# Patient Record
Sex: FEMALE | Race: BLACK OR AFRICAN AMERICAN
[De-identification: names, ages, dates, MRNs, and addresses within clinical notes are randomized per-mention and may not be internally consistent; named-entity substitution may affect disease eponyms.]

---

## 2018-01-27 ENCOUNTER — TRANSCRIPTION ENCOUNTER (OUTPATIENT)
Age: 23
End: 2018-01-27

## 2018-02-03 ENCOUNTER — EMERGENCY (EMERGENCY)
Facility: HOSPITAL | Age: 23
LOS: 0 days | Discharge: HOME | End: 2018-02-03
Attending: EMERGENCY MEDICINE | Admitting: EMERGENCY MEDICINE
Payer: MEDICAID

## 2018-02-03 VITALS — HEIGHT: 56 IN | WEIGHT: 95.02 LBS

## 2018-02-03 VITALS
DIASTOLIC BLOOD PRESSURE: 67 MMHG | HEART RATE: 99 BPM | OXYGEN SATURATION: 100 % | SYSTOLIC BLOOD PRESSURE: 106 MMHG | TEMPERATURE: 99 F | RESPIRATION RATE: 18 BRPM

## 2018-02-03 DIAGNOSIS — R35.0 FREQUENCY OF MICTURITION: ICD-10-CM

## 2018-02-03 DIAGNOSIS — R39.15 URGENCY OF URINATION: ICD-10-CM

## 2018-02-03 LAB
APPEARANCE UR: CLEAR — SIGNIFICANT CHANGE UP
BACTERIA # UR AUTO: (no result)
BILIRUB UR-MCNC: NEGATIVE — SIGNIFICANT CHANGE UP
COLOR SPEC: YELLOW — SIGNIFICANT CHANGE UP
DIFF PNL FLD: (no result)
EPI CELLS # UR: SIGNIFICANT CHANGE UP
GLUCOSE UR QL: NEGATIVE MG/DL — SIGNIFICANT CHANGE UP
KETONES UR-MCNC: NEGATIVE — SIGNIFICANT CHANGE UP
LEUKOCYTE ESTERASE UR-ACNC: NEGATIVE — SIGNIFICANT CHANGE UP
NITRITE UR-MCNC: NEGATIVE — SIGNIFICANT CHANGE UP
PH UR: 7 — SIGNIFICANT CHANGE UP (ref 5–8)
PROT UR-MCNC: NEGATIVE MG/DL — SIGNIFICANT CHANGE UP
RBC CASTS # UR COMP ASSIST: >50 /HPF (ref 0–4)
SP GR SPEC: 1.01 — SIGNIFICANT CHANGE UP (ref 1.01–1.02)
UROBILINOGEN FLD QL: NEGATIVE MG/DL — SIGNIFICANT CHANGE UP
WBC UR QL: SIGNIFICANT CHANGE UP

## 2018-02-03 PROCEDURE — 99283 EMERGENCY DEPT VISIT LOW MDM: CPT

## 2018-02-03 RX ORDER — METHENAMINE MANDELATE 1 G
1 TABLET ORAL
Qty: 6 | Refills: 0 | OUTPATIENT
Start: 2018-02-03 | End: 2018-02-05

## 2018-02-03 NOTE — ED ADULT NURSE NOTE - OBJECTIVE STATEMENT
Pt reports constant feeling that her bladder is full yet cannot urinate enough to alleviate sensationof fullness.  Pt is tender suprapubic upon palpation.  Denies pain during urination, denies dysuria

## 2018-02-03 NOTE — ED PROVIDER NOTE - CONSTITUTIONAL, MLM
normal... Well appearing B F, well nourished, awake, alert, oriented to person, place, time/situation and in no apparent distress.

## 2018-02-03 NOTE — ED PROVIDER NOTE - PROGRESS NOTE DETAILS
Dr. Kelly:  Reevaluated patient at bedside.  Patient in good comfort, still w/ urinary frequency, no abd/back/flank pain, no N/V.  Discussed the results of all diagnostic testing in ED and copies of all reports given.   An opportunity to ask questions was given.  Pt agreeable w/ D/C on po Abx, outpt f/u early next week.  Discussed the importance of prompt, close medical follow-up.  Patient will return with any changes, concerns or persistent / worsening symptoms.  Understanding of all instructions verbalized. Dr. Kelly:  Bladder scan done w/ RN: no post-void residual noted in bladder, it was decompressed.  Reevaluated patient at bedside.  Patient in good comfort, still w/ urinary frequency, no abd/back/flank pain, no N/V.  Discussed the results of all diagnostic testing in ED and copies of all reports given.   An opportunity to ask questions was given.  Pt agreeable w/ D/C on po Abx, outpt f/u early next week.  Discussed the importance of prompt, close medical follow-up.  Patient will return with any changes, concerns or persistent / worsening symptoms.  Understanding of all instructions verbalized.

## 2018-02-03 NOTE — ED ADULT TRIAGE NOTE - CHIEF COMPLAINT QUOTE
Pt states she feels like her bladder is filling up but she is not urinating a lot x 2-3 days.  Pt states UA at PMD office was negative.  Pt denies fever or pain.

## 2018-02-03 NOTE — ED PROVIDER NOTE - OBJECTIVE STATEMENT
(Exam started 08:50) 21 y/o black F with no PMH ambulatory to ED for evaluation of urinary urgency for 2-3 days. Pt reports sensation of unable to empty bladder. No dysuria, hematuria, fever, chills, loss of appetite, n/v/d, flank pain, abd pain. LNMP 1/12. Pt evaluated 1 week ago for lower abd pain. At that time UA/UCG negative, no acute pathology. Pt on OCP. PCP is Reba. (Exam started 08:50) 23 y/o black F with no PMH ambulatory to ED for evaluation of urinary urgency for 2-3 days. Pt reports sensation of unable to empty bladder. No dysuria, hematuria, fever, chills, loss of appetite, n/v/d, flank pain, abd pain. LNMP 1/12. Pt evaluated 1 week ago at [facility] for lower abd pain, which had thereafter self-resolved. At that time UA/UCG negative, no acute pathology. Pt on OCP. PCP is Reba.

## 2018-02-03 NOTE — ED PROVIDER NOTE - GASTROINTESTINAL, MLM
Abdomen soft, slight ttp to suprapubic region due to feeling of full bladder being pressed on, no guarding.

## 2018-02-03 NOTE — ED PROVIDER NOTE - MEDICAL DECISION MAKING DETAILS
Young B F, no PMH p/w 2-3 days of urinary urgency, unable to empty bladder sensation. No other complaints, Fever, chills, n/v/d. Plan UA/UCG, bladder scan post void.

## 2018-02-04 LAB
CULTURE RESULTS: SIGNIFICANT CHANGE UP
SPECIMEN SOURCE: SIGNIFICANT CHANGE UP

## 2018-07-27 ENCOUNTER — TRANSCRIPTION ENCOUNTER (OUTPATIENT)
Age: 23
End: 2018-07-27

## 2018-12-30 ENCOUNTER — TRANSCRIPTION ENCOUNTER (OUTPATIENT)
Age: 23
End: 2018-12-30

## 2019-05-11 ENCOUNTER — EMERGENCY (EMERGENCY)
Facility: HOSPITAL | Age: 24
LOS: 0 days | Discharge: HOME | End: 2019-05-11
Attending: EMERGENCY MEDICINE | Admitting: EMERGENCY MEDICINE
Payer: COMMERCIAL

## 2019-05-11 VITALS
DIASTOLIC BLOOD PRESSURE: 70 MMHG | HEART RATE: 95 BPM | RESPIRATION RATE: 17 BRPM | SYSTOLIC BLOOD PRESSURE: 120 MMHG | OXYGEN SATURATION: 100 %

## 2019-05-11 VITALS — WEIGHT: 104.06 LBS | HEIGHT: 56 IN

## 2019-05-11 DIAGNOSIS — K29.70 GASTRITIS, UNSPECIFIED, WITHOUT BLEEDING: ICD-10-CM

## 2019-05-11 DIAGNOSIS — R11.10 VOMITING, UNSPECIFIED: ICD-10-CM

## 2019-05-11 DIAGNOSIS — R19.7 DIARRHEA, UNSPECIFIED: ICD-10-CM

## 2019-05-11 DIAGNOSIS — R11.2 NAUSEA WITH VOMITING, UNSPECIFIED: ICD-10-CM

## 2019-05-11 LAB
ALBUMIN SERPL ELPH-MCNC: 4.3 G/DL — SIGNIFICANT CHANGE UP (ref 3.3–5)
ALP SERPL-CCNC: 83 U/L — SIGNIFICANT CHANGE UP (ref 40–120)
ALT FLD-CCNC: 30 U/L — SIGNIFICANT CHANGE UP (ref 12–78)
ANION GAP SERPL CALC-SCNC: 8 MMOL/L — SIGNIFICANT CHANGE UP (ref 5–17)
APPEARANCE UR: CLEAR — SIGNIFICANT CHANGE UP
AST SERPL-CCNC: 27 U/L — SIGNIFICANT CHANGE UP (ref 15–37)
BACTERIA # UR AUTO: ABNORMAL
BASOPHILS # BLD AUTO: 0.01 K/UL — SIGNIFICANT CHANGE UP (ref 0–0.2)
BASOPHILS NFR BLD AUTO: 0.2 % — SIGNIFICANT CHANGE UP (ref 0–2)
BILIRUB SERPL-MCNC: 1.1 MG/DL — SIGNIFICANT CHANGE UP (ref 0.2–1.2)
BILIRUB UR-MCNC: NEGATIVE — SIGNIFICANT CHANGE UP
BUN SERPL-MCNC: 8 MG/DL — SIGNIFICANT CHANGE UP (ref 7–23)
CALCIUM SERPL-MCNC: 8.5 MG/DL — SIGNIFICANT CHANGE UP (ref 8.5–10.1)
CHLORIDE SERPL-SCNC: 101 MMOL/L — SIGNIFICANT CHANGE UP (ref 96–108)
CO2 SERPL-SCNC: 25 MMOL/L — SIGNIFICANT CHANGE UP (ref 22–31)
COLOR SPEC: YELLOW — SIGNIFICANT CHANGE UP
CREAT SERPL-MCNC: 0.93 MG/DL — SIGNIFICANT CHANGE UP (ref 0.5–1.3)
DIFF PNL FLD: ABNORMAL
EOSINOPHIL # BLD AUTO: 0.05 K/UL — SIGNIFICANT CHANGE UP (ref 0–0.5)
EOSINOPHIL NFR BLD AUTO: 1 % — SIGNIFICANT CHANGE UP (ref 0–6)
EPI CELLS # UR: ABNORMAL
GLUCOSE SERPL-MCNC: 100 MG/DL — HIGH (ref 70–99)
GLUCOSE UR QL: NEGATIVE MG/DL — SIGNIFICANT CHANGE UP
HCT VFR BLD CALC: 45.2 % — HIGH (ref 34.5–45)
HGB BLD-MCNC: 15.4 G/DL — SIGNIFICANT CHANGE UP (ref 11.5–15.5)
IMM GRANULOCYTES NFR BLD AUTO: 0.2 % — SIGNIFICANT CHANGE UP (ref 0–1.5)
KETONES UR-MCNC: NEGATIVE — SIGNIFICANT CHANGE UP
LEUKOCYTE ESTERASE UR-ACNC: NEGATIVE — SIGNIFICANT CHANGE UP
LYMPHOCYTES # BLD AUTO: 0.73 K/UL — LOW (ref 1–3.3)
LYMPHOCYTES # BLD AUTO: 14.6 % — SIGNIFICANT CHANGE UP (ref 13–44)
MAGNESIUM SERPL-MCNC: 1.7 MG/DL — SIGNIFICANT CHANGE UP (ref 1.6–2.6)
MCHC RBC-ENTMCNC: 30.3 PG — SIGNIFICANT CHANGE UP (ref 27–34)
MCHC RBC-ENTMCNC: 34.1 GM/DL — SIGNIFICANT CHANGE UP (ref 32–36)
MCV RBC AUTO: 89 FL — SIGNIFICANT CHANGE UP (ref 80–100)
MONOCYTES # BLD AUTO: 0.47 K/UL — SIGNIFICANT CHANGE UP (ref 0–0.9)
MONOCYTES NFR BLD AUTO: 9.4 % — SIGNIFICANT CHANGE UP (ref 2–14)
NEUTROPHILS # BLD AUTO: 3.73 K/UL — SIGNIFICANT CHANGE UP (ref 1.8–7.4)
NEUTROPHILS NFR BLD AUTO: 74.6 % — SIGNIFICANT CHANGE UP (ref 43–77)
NITRITE UR-MCNC: NEGATIVE — SIGNIFICANT CHANGE UP
NRBC # BLD: 0 /100 WBCS — SIGNIFICANT CHANGE UP (ref 0–0)
PH UR: 6 — SIGNIFICANT CHANGE UP (ref 5–8)
PLATELET # BLD AUTO: 327 K/UL — SIGNIFICANT CHANGE UP (ref 150–400)
POTASSIUM SERPL-MCNC: 3 MMOL/L — LOW (ref 3.5–5.3)
POTASSIUM SERPL-SCNC: 3 MMOL/L — LOW (ref 3.5–5.3)
PROT SERPL-MCNC: 8.4 GM/DL — HIGH (ref 6–8.3)
PROT UR-MCNC: 30 MG/DL
RBC # BLD: 5.08 M/UL — SIGNIFICANT CHANGE UP (ref 3.8–5.2)
RBC # FLD: 12.1 % — SIGNIFICANT CHANGE UP (ref 10.3–14.5)
RBC CASTS # UR COMP ASSIST: SIGNIFICANT CHANGE UP /HPF (ref 0–4)
SODIUM SERPL-SCNC: 134 MMOL/L — LOW (ref 135–145)
SP GR SPEC: 1.01 — SIGNIFICANT CHANGE UP (ref 1.01–1.02)
UROBILINOGEN FLD QL: NEGATIVE MG/DL — SIGNIFICANT CHANGE UP
WBC # BLD: 5 K/UL — SIGNIFICANT CHANGE UP (ref 3.8–10.5)
WBC # FLD AUTO: 5 K/UL — SIGNIFICANT CHANGE UP (ref 3.8–10.5)
WBC UR QL: SIGNIFICANT CHANGE UP

## 2019-05-11 PROCEDURE — 99284 EMERGENCY DEPT VISIT MOD MDM: CPT

## 2019-05-11 RX ORDER — FAMOTIDINE 10 MG/ML
20 INJECTION INTRAVENOUS ONCE
Refills: 0 | Status: COMPLETED | OUTPATIENT
Start: 2019-05-11 | End: 2019-05-11

## 2019-05-11 RX ORDER — SODIUM CHLORIDE 9 MG/ML
1000 INJECTION INTRAMUSCULAR; INTRAVENOUS; SUBCUTANEOUS ONCE
Refills: 0 | Status: COMPLETED | OUTPATIENT
Start: 2019-05-11 | End: 2019-05-11

## 2019-05-11 RX ORDER — POTASSIUM CHLORIDE 20 MEQ
1 PACKET (EA) ORAL
Qty: 2 | Refills: 0
Start: 2019-05-11 | End: 2019-05-11

## 2019-05-11 RX ORDER — POTASSIUM CHLORIDE 20 MEQ
40 PACKET (EA) ORAL ONCE
Refills: 0 | Status: COMPLETED | OUTPATIENT
Start: 2019-05-11 | End: 2019-05-11

## 2019-05-11 RX ORDER — LIDOCAINE 4 G/100G
5 CREAM TOPICAL ONCE
Refills: 0 | Status: COMPLETED | OUTPATIENT
Start: 2019-05-11 | End: 2019-05-11

## 2019-05-11 RX ADMIN — Medication 30 MILLILITER(S): at 12:51

## 2019-05-11 RX ADMIN — SODIUM CHLORIDE 2000 MILLILITER(S): 9 INJECTION INTRAMUSCULAR; INTRAVENOUS; SUBCUTANEOUS at 11:46

## 2019-05-11 RX ADMIN — LIDOCAINE 5 MILLILITER(S): 4 CREAM TOPICAL at 12:51

## 2019-05-11 RX ADMIN — Medication 40 MILLIEQUIVALENT(S): at 12:20

## 2019-05-11 RX ADMIN — FAMOTIDINE 20 MILLIGRAM(S): 10 INJECTION INTRAVENOUS at 11:45

## 2019-05-11 NOTE — ED ADULT NURSE NOTE - CHPI ED NUR SYMPTOMS NEG
no blood in stool/no burning urination/no dysuria/no hematuria/no abdominal distension/no chills/no fever

## 2019-05-11 NOTE — ED STATDOCS - PROGRESS NOTE DETAILS
24 yr. old female PMH: presents to ED with nausea ,vomiting and diarrhea onset friday after going out drinking and having Chinese food. Yesterday felt faint ,+ diaphoresis and hypoglycemic. Seen and examined by attending in Intake. 24 yr. old female PMH: presents to ED with nausea ,vomiting and diarrhea onset friday after going out drinking and having Chinese food. Yesterday felt faint ,+ diaphoresis and hypoglycemic. Seen and examined by attending in Intake. Plan: IV, IVF, Labs, Zofran. Will F/U with results and re evaluate. Jessica KAUFMAN Results of Lab work discussed with patient and instructed to take KCL as directed and  Pepcid 20 mg. PO every 12 hrs. for epigastric pain. Avoid alcohol and spicy foods. F/U with GI. Jessica KAUFMAN

## 2019-05-11 NOTE — ED ADULT TRIAGE NOTE - NSWEIGHTCALCTOOLDRUG_GEN_A_CORE
Pt dropped off [facility] paper work to filled out. Aware it may take 7 business days. Call [phone] when ready.  used

## 2019-05-11 NOTE — ED STATDOCS - CLINICAL SUMMARY MEDICAL DECISION MAKING FREE TEXT BOX
sx could be due to gastritis vs. gastroenteritis vs. PUD vs. food-borne illness. IV fluids, hydration, labs to check liver function, kidney function, electrolytes, reassess.

## 2019-05-11 NOTE — ED STATDOCS - OBJECTIVE STATEMENT
25 y/o female with no PMHx presents to the ED c/o N/V/D following night of EtOH intake. Pt notes that two nights ago, she was out drinking for cousin's birthday after which she ate chinese food. Next morning, pt had multiple episodes of N/V x2 now resolved, multiple episodes of watery diarrhea. Pt states that yesterday she became diaphoretic characteristic of a hypoglycemic attack which she has had before. Denies hematemesis, hematuria, urinary frequency. No recent travel. No sick contacts. Pt is a nursing student. LNMP 1 month ago. Dc'd birth control 5 months ago.

## 2019-05-11 NOTE — ED STATDOCS - ATTENDING CONTRIBUTION TO CARE
I, Daron Zepeda, performed the initial face to face bedside interview with this patient regarding history of present illness, review of symptoms and relevant past medical, social and family history.  I completed an independent physical examination.  I was the initial provider who evaluated this patient. I have signed out the follow up of any pending tests (i.e. labs, radiological studies) to the ACP.  I have communicated the patient’s plan of care and disposition with the ACP.  The history, relevant review of systems, past medical and surgical history, medical decision making, and physical examination was documented by the scribe in my presence and I attest to the accuracy of the documentation.

## 2019-05-11 NOTE — ED STATDOCS - CARE PLAN
Principal Discharge DX:	Diarrhea  Secondary Diagnosis:	Nausea & vomiting  Secondary Diagnosis:	Gastritis

## 2019-05-11 NOTE — ED ADULT NURSE NOTE - OBJECTIVE STATEMENT
pt came to ED for evaluation of N/V/D after two nights of alcohol drinking. Pt sts she also had chinese food before the symptoms started. Denies any pain or any other complaints.

## 2019-05-12 LAB
CULTURE RESULTS: SIGNIFICANT CHANGE UP
SPECIMEN SOURCE: SIGNIFICANT CHANGE UP

## 2019-06-12 ENCOUNTER — EMERGENCY (EMERGENCY)
Facility: HOSPITAL | Age: 24
LOS: 0 days | Discharge: HOME | End: 2019-06-13
Attending: EMERGENCY MEDICINE | Admitting: EMERGENCY MEDICINE
Payer: COMMERCIAL

## 2019-06-12 VITALS — WEIGHT: 104.06 LBS | HEIGHT: 56 IN

## 2019-06-12 DIAGNOSIS — R07.89 OTHER CHEST PAIN: ICD-10-CM

## 2019-06-12 PROCEDURE — 99284 EMERGENCY DEPT VISIT MOD MDM: CPT | Mod: 25

## 2019-06-13 VITALS
RESPIRATION RATE: 17 BRPM | SYSTOLIC BLOOD PRESSURE: 120 MMHG | OXYGEN SATURATION: 100 % | DIASTOLIC BLOOD PRESSURE: 60 MMHG | HEART RATE: 90 BPM

## 2019-06-13 PROCEDURE — 93010 ELECTROCARDIOGRAM REPORT: CPT

## 2019-06-13 RX ORDER — LIDOCAINE 4 G/100G
10 CREAM TOPICAL ONCE
Refills: 0 | Status: COMPLETED | OUTPATIENT
Start: 2019-06-13 | End: 2019-06-13

## 2019-06-13 RX ADMIN — Medication 30 MILLILITER(S): at 01:08

## 2019-06-13 RX ADMIN — LIDOCAINE 10 MILLILITER(S): 4 CREAM TOPICAL at 01:08

## 2019-06-13 NOTE — ED PROVIDER NOTE - CLINICAL SUMMARY MEDICAL DECISION MAKING FREE TEXT BOX
atypical chest pain, no cardiac risk factors.  EKG wnl.  Pt informed states she came to ED to "make sure I wasn't having a heart attack"  however, pt does not want blood work done.  Pt given maalox/lido and feels better. wants to go home. will f/u as oupt

## 2019-06-13 NOTE — ED PROVIDER NOTE - NSFOLLOWUPINSTRUCTIONS_ED_ALL_ED_FT
Follow up with your PMD and with your cardiologist today  Return to ED for any further concerns/worsening symptoms  Watch your diet to see if you see a correlation between what you're eating and the symptoms    Chest Pain    Chest pain can be caused by many different conditions which may or may not be dangerous. Causes include heartburn, lung infections, heart attack, blood clot in lungs, skin infections, strain or damage to muscle, cartilage, or bones, etc. In addition to a history and physical examination, an electrocardiogram (ECG) or other lab tests may have been performed to determine the cause of your chest pain. Follow up with your primary care provider or with a cardiologist as instructed.     SEEK IMMEDIATE MEDICAL CARE IF YOU HAVE ANY OF THE FOLLOWING SYMPTOMS: worsening chest pain, coughing up blood, unexplained back/neck/jaw pain, severe abdominal pain, dizziness or lightheadedness, fainting, shortness of breath, sweaty or clammy skin, vomiting, or racing heart beat. These symptoms may represent a serious problem that is an emergency. Do not wait to see if the symptoms will go away. Get medical help right away. Call 911 and do not drive yourself to the hospital.

## 2019-06-13 NOTE — ED PROVIDER NOTE - OBJECTIVE STATEMENT
23 yo female c/o intermittent chest pain for the past 3 days.  Pt states the pain comes and goes, is substernal and sometimes left upper chest, sometimes radiates to the back or the neck.  Has had similar pain on and off for almost a year.  About 1 year ago, [facility] sent her to a cardiologist because of a murmur found on physical exam.  She saw a cardiologist at [facility] (?name) and had an Echo 23 yo female c/o intermittent chest pain for the past 3 days.  Pt states the pain comes and goes, is substernal and sometimes left upper chest, sometimes radiates to the back or the neck.  Has had similar pain on and off for almost a year.  About 1 year ago, PMD sent her to a cardiologist because of a murmur found on physical exam.  She saw a cardiologist at [facility] (?name) and had an Echo which was "fine" as per pt.  Nonsmoker, occasional drinker, last drank Saturday, no drugs.  LMP now. 23 yo female c/o intermittent chest pain for the past 3 days.  Pt states the pain comes and goes, is substernal and sometimes left upper chest, sometimes radiates to the back or the neck.  Has had similar pain on and off for almost a year.  About 1 year ago, PMD sent her to a cardiologist because of a murmur found on physical exam.  She saw a cardiologist at [facility] (?name) and had an Echo which was "fine" as per pt.  Nonsmoker, occasional drinker, last drank Saturday, no drugs.  LMP now. Pt states she has been eating a lot of "junk food"

## 2019-06-13 NOTE — ED ADULT NURSE NOTE - NSIMPLEMENTINTERV_GEN_ALL_ED
Implemented All Universal Safety Interventions:  [place] to call system. Call bell, personal items and telephone within reach. Instruct patient to call for assistance. Room bathroom lighting operational. Non-slip footwear when patient is off stretcher. Physically safe environment: no spills, clutter or unnecessary equipment. Stretcher in lowest position, wheels locked, appropriate side rails in place.

## 2020-04-25 ENCOUNTER — MESSAGE (OUTPATIENT)
Age: 25
End: 2020-04-25

## 2020-05-05 ENCOUNTER — TRANSCRIPTION ENCOUNTER (OUTPATIENT)
Age: 25
End: 2020-05-05

## 2020-06-10 ENCOUNTER — LABORATORY RESULT (OUTPATIENT)
Age: 25
End: 2020-06-10

## 2020-06-10 ENCOUNTER — APPOINTMENT (OUTPATIENT)
Dept: FAMILY MEDICINE | Facility: CLINIC | Age: 25
End: 2020-06-10
Payer: COMMERCIAL

## 2020-06-10 VITALS
HEIGHT: 56 IN | WEIGHT: 112 LBS | BODY MASS INDEX: 25.19 KG/M2 | RESPIRATION RATE: 16 BRPM | OXYGEN SATURATION: 96 % | HEART RATE: 98 BPM | DIASTOLIC BLOOD PRESSURE: 62 MMHG | SYSTOLIC BLOOD PRESSURE: 102 MMHG

## 2020-06-10 DIAGNOSIS — Z11.59 ENCOUNTER FOR SCREENING FOR OTHER VIRAL DISEASES: ICD-10-CM

## 2020-06-10 PROCEDURE — 36415 COLL VENOUS BLD VENIPUNCTURE: CPT

## 2020-06-10 PROCEDURE — G0442 ANNUAL ALCOHOL SCREEN 15 MIN: CPT

## 2020-06-10 PROCEDURE — 99385 PREV VISIT NEW AGE 18-39: CPT | Mod: 25

## 2020-06-10 NOTE — HEALTH RISK ASSESSMENT
[Excellent] : ~his/her~  mood as  excellent [Yes] : Yes [2 - 4 times a month (2 pts)] : 2-4 times a month (2 points) [1 or 2 (0 pts)] : 1 or 2 (0 points) [Never (0 pts)] : Never (0 points) [No] : In the past 12 months have you used drugs other than those required for medical reasons? No [0] : 1) Little interest or pleasure doing things: Not at all (0) [Patient reported PAP Smear was normal] : Patient reported PAP Smear was normal [HIV Test offered] : HIV Test offered [None] : None [With Significant Other] : lives with significant other [Employed] : employed [Significant Other] : lives with significant other [Sexually Active] : sexually active [Feels Safe at Home] : Feels safe at home [Smoke Detector] : smoke detector [Carbon Monoxide Detector] : carbon monoxide detector [Seat Belt] :  uses seat belt [Sunscreen] : uses sunscreen [Patient/Caregiver unclear of wishes] : Patient/Caregiver unclear of wishes [] : No [[id number]] : 0 [Audit-CScore] : 2 [Change in mental status noted] : No change in mental status noted [Language] : denies difficulty with language [High Risk Behavior] : no high risk behavior [Reports changes in hearing] : Reports no changes in hearing [Reports changes in vision] : Reports no changes in vision [TB Exposure] : is not being exposed to tuberculosis [Reports changes in dental health] : Reports no changes in dental health [PapSmearDate] : 8/2019 [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

## 2020-06-10 NOTE — ASSESSMENT
[FreeTextEntry1] : Annual/ Establish care\par Comprehensive labs\par Pap UTD\par Works as RN in [employer] \par Requesting Antibody testing.

## 2020-06-10 NOTE — HEALTH RISK ASSESSMENT
[Excellent] : ~his/her~  mood as  excellent [Yes] : Yes [2 - 4 times a month (2 pts)] : 2-4 times a month (2 points) [1 or 2 (0 pts)] : 1 or 2 (0 points) [Never (0 pts)] : Never (0 points) [No] : In the past 12 months have you used drugs other than those required for medical reasons? No [0] : 1) Little interest or pleasure doing things: Not at all (0) [Patient reported PAP Smear was normal] : Patient reported PAP Smear was normal [HIV Test offered] : HIV Test offered [None] : None [With Significant Other] : lives with significant other [Employed] : employed [Sexually Active] : sexually active [Significant Other] : lives with significant other [Feels Safe at Home] : Feels safe at home [Smoke Detector] : smoke detector [Carbon Monoxide Detector] : carbon monoxide detector [Seat Belt] :  uses seat belt [Sunscreen] : uses sunscreen [Patient/Caregiver unclear of wishes] : Patient/Caregiver unclear of wishes [] : No [[id number]] : 0 [Audit-CScore] : 2 [Change in mental status noted] : No change in mental status noted [Language] : denies difficulty with language [High Risk Behavior] : no high risk behavior [Reports changes in hearing] : Reports no changes in hearing [Reports changes in vision] : Reports no changes in vision [Reports changes in dental health] : Reports no changes in dental health [TB Exposure] : is not being exposed to tuberculosis [PapSmearDate] : 8/2019 [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

## 2020-06-10 NOTE — ASSESSMENT
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

## 2020-06-10 NOTE — PHYSICAL EXAM
[No Acute Distress] : no acute distress [Well Developed] : well developed [Well Nourished] : well nourished [Well-Appearing] : well-appearing [Normal Sclera/Conjunctiva] : normal sclera/conjunctiva [PERRL] : pupils equal round and reactive to light [EOMI] : extraocular movements intact [Normal Outer Ear/Nose] : the outer ears and nose were normal in appearance [Normal Oropharynx] : the oropharynx was normal [No JVD] : no jugular venous distention [No Lymphadenopathy] : no lymphadenopathy [Supple] : supple [Thyroid Normal, No Nodules] : the thyroid was normal and there were no nodules present [No Accessory Muscle Use] : no accessory muscle use [No Respiratory Distress] : no respiratory distress  [Clear to Auscultation] : lungs were clear to auscultation bilaterally [Normal Rate] : normal rate  [Normal S1, S2] : normal S1 and S2 [Regular Rhythm] : with a regular rhythm [No Murmur] : no murmur heard [No Carotid Bruits] : no carotid bruits [No Abdominal Bruit] : a ~M bruit was not heard ~T in the abdomen [No Varicosities] : no varicosities [Pedal Pulses Present] : the pedal pulses are present [No Palpable Aorta] : no palpable aorta [No Edema] : there was no peripheral edema [No Extremity Clubbing/Cyanosis] : no extremity clubbing/cyanosis [Soft] : abdomen soft [Non-distended] : non-distended [Non Tender] : non-tender [No Masses] : no abdominal mass palpated [No HSM] : no HSM [Normal Posterior Cervical Nodes] : no posterior cervical lymphadenopathy [Normal Bowel Sounds] : normal bowel sounds [Normal Anterior Cervical Nodes] : no anterior cervical lymphadenopathy [No CVA Tenderness] : no CVA  tenderness [No Spinal Tenderness] : no spinal tenderness [No Joint Swelling] : no joint swelling [Grossly Normal Strength/Tone] : grossly normal strength/tone [No Rash] : no rash [Coordination Grossly Intact] : coordination grossly intact [No Focal Deficits] : no focal deficits [Normal Gait] : normal gait [Deep Tendon Reflexes (DTR)] : deep tendon reflexes were 2+ and symmetric [Normal Affect] : the affect was normal [Normal Insight/Judgement] : insight and judgment were intact

## 2020-06-10 NOTE — HISTORY OF PRESENT ILLNESS
[FreeTextEntry1] : annual/ establish care [de-identified] : 25 yr old female here to establish care

## 2020-06-10 NOTE — PHYSICAL EXAM
[No Acute Distress] : no acute distress [Well Nourished] : well nourished [Well Developed] : well developed [Well-Appearing] : well-appearing [Normal Sclera/Conjunctiva] : normal sclera/conjunctiva [PERRL] : pupils equal round and reactive to light [EOMI] : extraocular movements intact [Normal Outer Ear/Nose] : the outer ears and nose were normal in appearance [Normal Oropharynx] : the oropharynx was normal [No JVD] : no jugular venous distention [Supple] : supple [No Lymphadenopathy] : no lymphadenopathy [Thyroid Normal, No Nodules] : the thyroid was normal and there were no nodules present [No Respiratory Distress] : no respiratory distress  [No Accessory Muscle Use] : no accessory muscle use [Clear to Auscultation] : lungs were clear to auscultation bilaterally [Normal Rate] : normal rate  [Regular Rhythm] : with a regular rhythm [Normal S1, S2] : normal S1 and S2 [No Murmur] : no murmur heard [No Carotid Bruits] : no carotid bruits [No Varicosities] : no varicosities [Pedal Pulses Present] : the pedal pulses are present [No Abdominal Bruit] : a ~M bruit was not heard ~T in the abdomen [No Palpable Aorta] : no palpable aorta [No Edema] : there was no peripheral edema [No Extremity Clubbing/Cyanosis] : no extremity clubbing/cyanosis [Soft] : abdomen soft [Non-distended] : non-distended [Non Tender] : non-tender [No Masses] : no abdominal mass palpated [No HSM] : no HSM [Normal Posterior Cervical Nodes] : no posterior cervical lymphadenopathy [Normal Bowel Sounds] : normal bowel sounds [Normal Anterior Cervical Nodes] : no anterior cervical lymphadenopathy [No Spinal Tenderness] : no spinal tenderness [No CVA Tenderness] : no CVA  tenderness [Grossly Normal Strength/Tone] : grossly normal strength/tone [No Joint Swelling] : no joint swelling [No Rash] : no rash [Coordination Grossly Intact] : coordination grossly intact [No Focal Deficits] : no focal deficits [Deep Tendon Reflexes (DTR)] : deep tendon reflexes were 2+ and symmetric [Normal Gait] : normal gait [Normal Insight/Judgement] : insight and judgment were intact [Normal Affect] : the affect was normal

## 2020-06-10 NOTE — HISTORY OF PRESENT ILLNESS
[FreeTextEntry1] : annual/ establish care [de-identified] : 25 yr old female here to establish care

## 2020-06-17 ENCOUNTER — TRANSCRIPTION ENCOUNTER (OUTPATIENT)
Age: 25
End: 2020-06-17

## 2020-06-17 LAB
25(OH)D3 SERPL-MCNC: 15.5 NG/ML
ALBUMIN SERPL ELPH-MCNC: 4.6 G/DL
ALP BLD-CCNC: 62 U/L
ALT SERPL-CCNC: 14 U/L
ANION GAP SERPL CALC-SCNC: 13 MMOL/L
APPEARANCE: ABNORMAL
AST SERPL-CCNC: 19 U/L
BASOPHILS # BLD AUTO: 0.03 K/UL
BASOPHILS NFR BLD AUTO: 0.5 %
BILIRUB SERPL-MCNC: 0.5 MG/DL
BILIRUBIN URINE: NEGATIVE
BLOOD URINE: NEGATIVE
BUN SERPL-MCNC: 11 MG/DL
CALCIUM SERPL-MCNC: 9.3 MG/DL
CHLORIDE SERPL-SCNC: 102 MMOL/L
CHOLEST SERPL-MCNC: 176 MG/DL
CHOLEST/HDLC SERPL: 3.3 RATIO
CO2 SERPL-SCNC: 24 MMOL/L
COLOR: YELLOW
CREAT SERPL-MCNC: 0.76 MG/DL
EOSINOPHIL # BLD AUTO: 0.11 K/UL
EOSINOPHIL NFR BLD AUTO: 1.9 %
ESTIMATED AVERAGE GLUCOSE: 105 MG/DL
GLUCOSE QUALITATIVE U: NEGATIVE
GLUCOSE SERPL-MCNC: 98 MG/DL
HBA1C MFR BLD HPLC: 5.3 %
HCT VFR BLD CALC: 40.5 %
HDLC SERPL-MCNC: 53 MG/DL
HGB BLD-MCNC: 13 G/DL
IMM GRANULOCYTES NFR BLD AUTO: 0.2 %
KETONES URINE: NORMAL
LDLC SERPL CALC-MCNC: 107 MG/DL
LEUKOCYTE ESTERASE URINE: NEGATIVE
LYMPHOCYTES # BLD AUTO: 2.14 K/UL
LYMPHOCYTES NFR BLD AUTO: 37.2 %
MAN DIFF?: NORMAL
MCHC RBC-ENTMCNC: 29.8 PG
MCHC RBC-ENTMCNC: 32.1 GM/DL
MCV RBC AUTO: 92.9 FL
MONOCYTES # BLD AUTO: 0.65 K/UL
MONOCYTES NFR BLD AUTO: 11.3 %
NEUTROPHILS # BLD AUTO: 2.82 K/UL
NEUTROPHILS NFR BLD AUTO: 48.9 %
NITRITE URINE: NEGATIVE
PH URINE: 6
PLATELET # BLD AUTO: 326 K/UL
POTASSIUM SERPL-SCNC: 3.6 MMOL/L
PROT SERPL-MCNC: 6.9 G/DL
PROTEIN URINE: NORMAL
RBC # BLD: 4.36 M/UL
RBC # FLD: 12.9 %
SARS-COV-2 IGG SERPL IA-ACNC: <0.1 INDEX
SARS-COV-2 IGG SERPL QL IA: NEGATIVE
SODIUM SERPL-SCNC: 139 MMOL/L
SPECIFIC GRAVITY URINE: 1.03
T4 SERPL-MCNC: 7.3 UG/DL
TRIGL SERPL-MCNC: 85 MG/DL
TSH SERPL-ACNC: 3.34 UIU/ML
URATE SERPL-MCNC: 5.4 MG/DL
UROBILINOGEN URINE: NORMAL
WBC # FLD AUTO: 5.76 K/UL

## 2020-08-03 ENCOUNTER — APPOINTMENT (OUTPATIENT)
Dept: OBGYN | Facility: CLINIC | Age: 25
End: 2020-08-03
Payer: COMMERCIAL

## 2020-08-03 VITALS
BODY MASS INDEX: 25.42 KG/M2 | HEIGHT: 56 IN | DIASTOLIC BLOOD PRESSURE: 70 MMHG | SYSTOLIC BLOOD PRESSURE: 120 MMHG | WEIGHT: 113 LBS

## 2020-08-03 DIAGNOSIS — Z30.011 ENCOUNTER FOR INITIAL PRESCRIPTION OF CONTRACEPTIVE PILLS: ICD-10-CM

## 2020-08-03 DIAGNOSIS — Z01.419 ENCOUNTER FOR GYNECOLOGICAL EXAMINATION (GENERAL) (ROUTINE) W/OUT ABNORMAL FINDINGS: ICD-10-CM

## 2020-08-03 PROCEDURE — 99385 PREV VISIT NEW AGE 18-39: CPT

## 2020-08-04 LAB
C TRACH RRNA SPEC QL NAA+PROBE: NOT DETECTED
N GONORRHOEA RRNA SPEC QL NAA+PROBE: NOT DETECTED
SOURCE TP AMPLIFICATION: NORMAL

## 2020-09-03 ENCOUNTER — RX RENEWAL (OUTPATIENT)
Age: 25
End: 2020-09-03

## 2020-09-14 NOTE — ED ADULT NURSE NOTE - ATTEMPT TO OOB
Department of Anesthesiology  Postprocedure Note    Patient: Ailyn Ritter  MRN: [id number]  YOB: 1955  Date of evaluation: 9/14/2020  Time:  12:47 PM     Procedure Summary     Date:  09/14/20 Room / Location:  [address] [place] [address]    Anesthesia Start:  1673 Anesthesia Stop:  5526    Procedure:  Laparoscopic Robotic Assisted Left Inguinal Hernia Repair (Left ) Diagnosis:  (Bilateral Inguinal Hernias)    Surgeon:  Lukasz Pierce DO Responsible Provider:  MENDOZA Carr CRNA    Anesthesia Type:  general ASA Status:  2          Anesthesia Type: general    Joshua Phase I: Joshua Score: 10    Joshua Phase II:      Last vitals: Reviewed and per EMR flowsheets.        Anesthesia Post Evaluation    Patient location during evaluation: bedside  Patient participation: complete - patient participated  Level of consciousness: sleepy but conscious  Pain score: 1  Airway patency: patent  Nausea & Vomiting: no nausea and no vomiting  Complications: no  Cardiovascular status: hemodynamically stable  Respiratory status: acceptable  Hydration status: euvolemic no

## 2020-12-23 PROBLEM — Z01.419 ENCOUNTER FOR ANNUAL ROUTINE GYNECOLOGICAL EXAMINATION: Status: RESOLVED | Noted: 2020-08-03 | Resolved: 2020-12-23

## 2021-02-26 ENCOUNTER — NON-APPOINTMENT (OUTPATIENT)
Age: 26
End: 2021-02-26

## 2021-03-15 ENCOUNTER — APPOINTMENT (OUTPATIENT)
Dept: OBGYN | Facility: CLINIC | Age: 26
End: 2021-03-15
Payer: COMMERCIAL

## 2021-03-15 VITALS
DIASTOLIC BLOOD PRESSURE: 66 MMHG | HEIGHT: 56 IN | BODY MASS INDEX: 25.19 KG/M2 | SYSTOLIC BLOOD PRESSURE: 118 MMHG | WEIGHT: 112 LBS

## 2021-03-15 DIAGNOSIS — Z30.09 ENCOUNTER FOR OTHER GENERAL COUNSELING AND ADVICE ON CONTRACEPTION: ICD-10-CM

## 2021-03-15 DIAGNOSIS — N92.6 IRREGULAR MENSTRUATION, UNSPECIFIED: ICD-10-CM

## 2021-03-15 PROCEDURE — 99072 ADDL SUPL MATRL&STAF TM PHE: CPT

## 2021-03-15 PROCEDURE — 99212 OFFICE O/P EST SF 10 MIN: CPT

## 2021-03-16 PROBLEM — N92.6 IRREGULAR MENSES: Status: ACTIVE | Noted: 2021-03-16

## 2021-03-16 PROBLEM — Z30.09 ENCOUNTER FOR CONTRACEPTIVE PLANNING: Status: ACTIVE | Noted: 2021-03-16

## 2021-03-18 ENCOUNTER — APPOINTMENT (OUTPATIENT)
Dept: DERMATOLOGY | Facility: CLINIC | Age: 26
End: 2021-03-18
Payer: COMMERCIAL

## 2021-03-18 ENCOUNTER — NON-APPOINTMENT (OUTPATIENT)
Age: 26
End: 2021-03-18

## 2021-03-18 PROCEDURE — 99204 OFFICE O/P NEW MOD 45 MIN: CPT

## 2021-03-18 PROCEDURE — 99072 ADDL SUPL MATRL&STAF TM PHE: CPT

## 2021-06-17 ENCOUNTER — APPOINTMENT (OUTPATIENT)
Dept: DERMATOLOGY | Facility: CLINIC | Age: 26
End: 2021-06-17

## 2021-07-15 ENCOUNTER — APPOINTMENT (OUTPATIENT)
Dept: DERMATOLOGY | Facility: CLINIC | Age: 26
End: 2021-07-15
Payer: COMMERCIAL

## 2021-07-15 DIAGNOSIS — Z51.81 ENCOUNTER FOR THERAPEUTIC DRUG LVL MONITORING: ICD-10-CM

## 2021-07-15 PROCEDURE — 99072 ADDL SUPL MATRL&STAF TM PHE: CPT

## 2021-07-15 PROCEDURE — 99214 OFFICE O/P EST MOD 30 MIN: CPT

## 2021-07-16 PROBLEM — Z51.81 ENCOUNTER FOR MEDICATION MONITORING: Status: ACTIVE | Noted: 2021-03-18

## 2021-07-20 ENCOUNTER — TRANSCRIPTION ENCOUNTER (OUTPATIENT)
Age: 26
End: 2021-07-20

## 2021-08-24 ENCOUNTER — APPOINTMENT (OUTPATIENT)
Dept: INTERNAL MEDICINE | Facility: CLINIC | Age: 26
End: 2021-08-24
Payer: COMMERCIAL

## 2021-08-24 VITALS
HEART RATE: 99 BPM | DIASTOLIC BLOOD PRESSURE: 60 MMHG | SYSTOLIC BLOOD PRESSURE: 100 MMHG | TEMPERATURE: 98 F | RESPIRATION RATE: 16 BRPM | OXYGEN SATURATION: 98 % | HEIGHT: 56 IN | WEIGHT: 112 LBS | BODY MASS INDEX: 25.19 KG/M2

## 2021-08-24 PROCEDURE — 99395 PREV VISIT EST AGE 18-39: CPT

## 2021-08-24 RX ORDER — NORETHINDRONE ACETATE/ETHINYL ESTRADIOL AND FERROUS FUMARATE 1MG-20(21)
1-20 KIT ORAL DAILY
Qty: 1 | Refills: 5 | Status: DISCONTINUED | COMMUNITY
Start: 2020-08-03 | End: 2021-08-24

## 2021-08-24 NOTE — HISTORY OF PRESENT ILLNESS
[FreeTextEntry1] : wellness exam [de-identified] : Ms. MARVIN MARTINEZ  is    26 year female . pt.have no known medical history.\par sees dermatology for hyperhidrosis , on glycopyrrolate.\par Pt. is over all feeling well.\par No change in weight.\par No change is bowel and bladder habit.\par No trouble sleeping at night.\par Not on pain.\par

## 2021-08-24 NOTE — HEALTH RISK ASSESSMENT
[Excellent] : ~his/her~  mood as  excellent [Yes] : Yes [2 - 4 times a month (2 pts)] : 2-4 times a month (2 points) [1 or 2 (0 pts)] : 1 or 2 (0 points) [No] : In the past 12 months have you used drugs other than those required for medical reasons? No [No falls in past year] : Patient reported no falls in the past year [0] : 2) Feeling down, depressed, or hopeless: Not at all (0) [Patient reported PAP Smear was normal] : Patient reported PAP Smear was normal [HIV Test offered] : HIV Test offered [Hepatitis C test offered] : Hepatitis C test offered [None] : None [With Family] : lives with family [Employed] : employed [Single] : single [Sexually Active] : sexually active [Feels Safe at Home] : Feels safe at home [Fully functional (bathing, dressing, toileting, transferring, walking, feeding)] : Fully functional (bathing, dressing, toileting, transferring, walking, feeding) [Fully functional (using the telephone, shopping, preparing meals, housekeeping, doing laundry, using] : Fully functional and needs no help or supervision to perform IADLs (using the telephone, shopping, preparing meals, housekeeping, doing laundry, using transportation, managing medications and managing finances) [Carbon Monoxide Detector] : carbon monoxide detector [Seat Belt] :  uses seat belt [Sunscreen] : uses sunscreen [PHQ-2 Negative - No further assessment needed] : PHQ-2 Negative - No further assessment needed [] : No [de-identified] : do not exercise [de-identified] : trying to eat healthy [[id number]] : 0 [Change in mental status noted] : No change in mental status noted [Language] : denies difficulty with language [Behavior] : denies difficulty with behavior [High Risk Behavior] : no high risk behavior [Reports changes in hearing] : Reports no changes in hearing [Reports changes in vision] : Reports no changes in vision [Reports changes in dental health] : Reports no changes in dental health [Travel to Developing Areas] : does not  travel to developing areas [PapSmearDate] : 08/20

## 2021-08-24 NOTE — ASSESSMENT
[FreeTextEntry1] : HM:\par will do comprehensive blood work , screen for hyperlipidemia , diabetes and thyroid disorder.\par quantiferon gold tb test done\par labs drawn in the office.\par will review the results and address if abnormal.\par she is up to date on  PAP : result  normal.\par up to date with flu , tdap, covid vaccine.\par Ordered HIV and Hep C  screening test.\par alcohol screening :SIBRT:0\par Depression screening:PHQ2:0\par \par Recommended:\par Being physically active\par Maintaining a healthy weight\par Eating a diet rich in fruits, vegetables, whole grains, and low in saturated/trans fat\par safe sexual practice.\par Avoiding excess sun exposure.using sunscreen.\par .\par

## 2021-08-25 LAB
25(OH)D3 SERPL-MCNC: 21.5 NG/ML
ALBUMIN SERPL ELPH-MCNC: 4.4 G/DL
ALP BLD-CCNC: 61 U/L
ALT SERPL-CCNC: 11 U/L
ANION GAP SERPL CALC-SCNC: 15 MMOL/L
APPEARANCE: CLEAR
AST SERPL-CCNC: 18 U/L
BACTERIA: NEGATIVE
BASOPHILS # BLD AUTO: 0.03 K/UL
BASOPHILS NFR BLD AUTO: 0.6 %
BILIRUB SERPL-MCNC: 0.6 MG/DL
BILIRUBIN URINE: NEGATIVE
BLOOD URINE: NEGATIVE
BUN SERPL-MCNC: 11 MG/DL
CALCIUM SERPL-MCNC: 9.4 MG/DL
CHLORIDE SERPL-SCNC: 102 MMOL/L
CHOLEST SERPL-MCNC: 195 MG/DL
CO2 SERPL-SCNC: 21 MMOL/L
COLOR: YELLOW
CREAT SERPL-MCNC: 0.73 MG/DL
EOSINOPHIL # BLD AUTO: 0.09 K/UL
EOSINOPHIL NFR BLD AUTO: 1.8 %
GLUCOSE QUALITATIVE U: NEGATIVE
GLUCOSE SERPL-MCNC: 127 MG/DL
HCT VFR BLD CALC: 42.2 %
HCV AB SER QL: NONREACTIVE
HCV S/CO RATIO: 0.1 S/CO
HDLC SERPL-MCNC: 56 MG/DL
HGB BLD-MCNC: 13.3 G/DL
HIV1+2 AB SPEC QL IA.RAPID: NONREACTIVE
HYALINE CASTS: 1 /LPF
IMM GRANULOCYTES NFR BLD AUTO: 0.2 %
KETONES URINE: NEGATIVE
LDLC SERPL CALC-MCNC: 124 MG/DL
LEUKOCYTE ESTERASE URINE: NEGATIVE
LYMPHOCYTES # BLD AUTO: 1.95 K/UL
LYMPHOCYTES NFR BLD AUTO: 38.4 %
MAN DIFF?: NORMAL
MCHC RBC-ENTMCNC: 29.9 PG
MCHC RBC-ENTMCNC: 31.5 GM/DL
MCV RBC AUTO: 94.8 FL
MICROSCOPIC-UA: NORMAL
MONOCYTES # BLD AUTO: 0.52 K/UL
MONOCYTES NFR BLD AUTO: 10.2 %
NEUTROPHILS # BLD AUTO: 2.48 K/UL
NEUTROPHILS NFR BLD AUTO: 48.8 %
NITRITE URINE: NEGATIVE
NONHDLC SERPL-MCNC: 139 MG/DL
PH URINE: 5.5
PLATELET # BLD AUTO: 348 K/UL
POTASSIUM SERPL-SCNC: 3.7 MMOL/L
PROT SERPL-MCNC: 6.8 G/DL
PROTEIN URINE: NEGATIVE
RBC # BLD: 4.45 M/UL
RBC # FLD: 12.8 %
RED BLOOD CELLS URINE: 1 /HPF
SODIUM SERPL-SCNC: 138 MMOL/L
SPECIFIC GRAVITY URINE: 1.03
SQUAMOUS EPITHELIAL CELLS: 3 /HPF
TRIGL SERPL-MCNC: 73 MG/DL
UROBILINOGEN URINE: NORMAL
WBC # FLD AUTO: 5.08 K/UL
WHITE BLOOD CELLS URINE: 0 /HPF

## 2021-08-26 ENCOUNTER — TRANSCRIPTION ENCOUNTER (OUTPATIENT)
Age: 26
End: 2021-08-26

## 2021-08-31 ENCOUNTER — TRANSCRIPTION ENCOUNTER (OUTPATIENT)
Age: 26
End: 2021-08-31

## 2021-08-31 LAB
M TB IFN-G BLD-IMP: NEGATIVE
QUANTIFERON TB PLUS MITOGEN MINUS NIL: 7.82 IU/ML
QUANTIFERON TB PLUS NIL: 0.08 IU/ML
QUANTIFERON TB PLUS TB1 MINUS NIL: -0.04 IU/ML
QUANTIFERON TB PLUS TB2 MINUS NIL: -0.04 IU/ML

## 2021-09-07 ENCOUNTER — TRANSCRIPTION ENCOUNTER (OUTPATIENT)
Age: 26
End: 2021-09-07

## 2021-09-07 ENCOUNTER — NON-APPOINTMENT (OUTPATIENT)
Age: 26
End: 2021-09-07

## 2021-09-07 DIAGNOSIS — Z01.84 ENCOUNTER FOR ANTIBODY RESPONSE EXAMINATION: ICD-10-CM

## 2021-09-13 ENCOUNTER — TRANSCRIPTION ENCOUNTER (OUTPATIENT)
Age: 26
End: 2021-09-13

## 2021-09-13 LAB
HBV SURFACE AB SER QL: REACTIVE
MEV IGG FLD QL IA: 16.5 AU/ML
MEV IGG+IGM SER-IMP: POSITIVE
MUV AB SER-ACNC: POSITIVE
MUV IGG SER QL IA: 119 AU/ML
RUBV IGG FLD-ACNC: 4.2 INDEX
RUBV IGG SER-IMP: POSITIVE
VZV AB TITR SER: POSITIVE
VZV IGG SER IF-ACNC: 223.1 INDEX

## 2021-09-14 ENCOUNTER — TRANSCRIPTION ENCOUNTER (OUTPATIENT)
Age: 26
End: 2021-09-14

## 2021-09-15 ENCOUNTER — TRANSCRIPTION ENCOUNTER (OUTPATIENT)
Age: 26
End: 2021-09-15

## 2021-09-16 ENCOUNTER — TRANSCRIPTION ENCOUNTER (OUTPATIENT)
Age: 26
End: 2021-09-16

## 2021-09-20 ENCOUNTER — TRANSCRIPTION ENCOUNTER (OUTPATIENT)
Age: 26
End: 2021-09-20

## 2021-09-24 DIAGNOSIS — Z23 ENCOUNTER FOR IMMUNIZATION: ICD-10-CM

## 2021-09-28 ENCOUNTER — TRANSCRIPTION ENCOUNTER (OUTPATIENT)
Age: 26
End: 2021-09-28

## 2021-09-29 ENCOUNTER — APPOINTMENT (OUTPATIENT)
Dept: FAMILY MEDICINE | Facility: CLINIC | Age: 26
End: 2021-09-29
Payer: COMMERCIAL

## 2021-09-29 PROCEDURE — 90686 IIV4 VACC NO PRSV 0.5 ML IM: CPT

## 2021-09-29 PROCEDURE — 90471 IMMUNIZATION ADMIN: CPT

## 2021-09-30 ENCOUNTER — APPOINTMENT (OUTPATIENT)
Dept: OBGYN | Facility: CLINIC | Age: 26
End: 2021-09-30
Payer: COMMERCIAL

## 2021-09-30 VITALS
HEIGHT: 56 IN | WEIGHT: 114 LBS | BODY MASS INDEX: 25.64 KG/M2 | DIASTOLIC BLOOD PRESSURE: 60 MMHG | SYSTOLIC BLOOD PRESSURE: 100 MMHG

## 2021-09-30 DIAGNOSIS — Z01.419 ENCOUNTER FOR GYNECOLOGICAL EXAMINATION (GENERAL) (ROUTINE) W/OUT ABNORMAL FINDINGS: ICD-10-CM

## 2021-09-30 PROCEDURE — 99395 PREV VISIT EST AGE 18-39: CPT

## 2021-10-05 LAB
C TRACH RRNA SPEC QL NAA+PROBE: NOT DETECTED
N GONORRHOEA RRNA SPEC QL NAA+PROBE: NOT DETECTED
SOURCE AMPLIFICATION: NORMAL

## 2021-10-28 ENCOUNTER — APPOINTMENT (OUTPATIENT)
Dept: DERMATOLOGY | Facility: CLINIC | Age: 26
End: 2021-10-28
Payer: COMMERCIAL

## 2021-10-28 ENCOUNTER — APPOINTMENT (OUTPATIENT)
Dept: FAMILY MEDICINE | Facility: CLINIC | Age: 26
End: 2021-10-28

## 2021-10-28 PROCEDURE — 99213 OFFICE O/P EST LOW 20 MIN: CPT

## 2021-10-28 RX ORDER — ALUMINUM CHLORIDE 20 %
20 SOLUTION, NON-ORAL TOPICAL
Qty: 1 | Refills: 5 | Status: DISCONTINUED | COMMUNITY
Start: 2021-03-18 | End: 2021-10-28

## 2021-10-28 RX ORDER — GLYCOPYRROLATE 1 MG/1
1 TABLET ORAL
Qty: 270 | Refills: 1 | Status: DISCONTINUED | COMMUNITY
Start: 2021-03-18 | End: 2021-10-28

## 2021-11-30 ENCOUNTER — TRANSCRIPTION ENCOUNTER (OUTPATIENT)
Age: 26
End: 2021-11-30

## 2022-01-18 ENCOUNTER — APPOINTMENT (OUTPATIENT)
Dept: DERMATOLOGY | Facility: CLINIC | Age: 27
End: 2022-01-18
Payer: COMMERCIAL

## 2022-01-18 PROCEDURE — 99213 OFFICE O/P EST LOW 20 MIN: CPT

## 2022-04-05 ENCOUNTER — APPOINTMENT (OUTPATIENT)
Dept: DERMATOLOGY | Facility: CLINIC | Age: 27
End: 2022-04-05
Payer: COMMERCIAL

## 2022-04-05 PROCEDURE — 99214 OFFICE O/P EST MOD 30 MIN: CPT

## 2022-04-11 PROBLEM — Z11.59 SCREENING FOR VIRAL DISEASE: Status: ACTIVE | Noted: 2020-06-10

## 2022-05-02 ENCOUNTER — LABORATORY RESULT (OUTPATIENT)
Age: 27
End: 2022-05-02

## 2022-05-02 ENCOUNTER — APPOINTMENT (OUTPATIENT)
Dept: FAMILY MEDICINE | Facility: CLINIC | Age: 27
End: 2022-05-02
Payer: COMMERCIAL

## 2022-05-02 VITALS
OXYGEN SATURATION: 98 % | SYSTOLIC BLOOD PRESSURE: 110 MMHG | HEART RATE: 75 BPM | HEIGHT: 56 IN | TEMPERATURE: 97.6 F | RESPIRATION RATE: 15 BRPM | WEIGHT: 115 LBS | BODY MASS INDEX: 25.87 KG/M2 | DIASTOLIC BLOOD PRESSURE: 62 MMHG

## 2022-05-02 DIAGNOSIS — Z13.29 ENCOUNTER FOR SCREENING FOR OTHER SUSPECTED ENDOCRINE DISORDER: ICD-10-CM

## 2022-05-02 DIAGNOSIS — Z13.220 ENCOUNTER FOR SCREENING FOR LIPOID DISORDERS: ICD-10-CM

## 2022-05-02 DIAGNOSIS — Z13.39 ENCOUNTER FOR SCREENING EXAM FOR OTHER MENTAL HEALTH AND BEHAVIORAL DISORDERS: ICD-10-CM

## 2022-05-02 DIAGNOSIS — Z83.3 FAMILY HISTORY OF DIABETES MELLITUS: ICD-10-CM

## 2022-05-02 DIAGNOSIS — Z13.31 ENCOUNTER FOR SCREENING FOR DEPRESSION: ICD-10-CM

## 2022-05-02 DIAGNOSIS — Z11.3 ENCOUNTER FOR SCREENING FOR INFECTIONS WITH A PREDOMINANTLY SEXUAL MODE OF TRANSMISSION: ICD-10-CM

## 2022-05-02 DIAGNOSIS — Z00.00 ENCOUNTER FOR GENERAL ADULT MEDICAL EXAMINATION W/OUT ABNORMAL FINDINGS: ICD-10-CM

## 2022-05-02 DIAGNOSIS — R61 GENERALIZED HYPERHIDROSIS: ICD-10-CM

## 2022-05-02 DIAGNOSIS — Z11.4 ENCOUNTER FOR SCREENING FOR HUMAN IMMUNODEFICIENCY VIRUS [HIV]: ICD-10-CM

## 2022-05-02 PROCEDURE — G0444 DEPRESSION SCREEN ANNUAL: CPT | Mod: 59

## 2022-05-02 PROCEDURE — 36415 COLL VENOUS BLD VENIPUNCTURE: CPT

## 2022-05-02 PROCEDURE — 99395 PREV VISIT EST AGE 18-39: CPT | Mod: 25

## 2022-05-02 PROCEDURE — G0442 ANNUAL ALCOHOL SCREEN 15 MIN: CPT

## 2022-05-02 NOTE — PHYSICAL EXAM
[Well Nourished] : well nourished [Well Developed] : well developed [Well-Appearing] : well-appearing [Normal Sclera/Conjunctiva] : normal sclera/conjunctiva [Normal Outer Ear/Nose] : the outer ears and nose were normal in appearance [No JVD] : no jugular venous distention [No Lymphadenopathy] : no lymphadenopathy [Supple] : supple [No Respiratory Distress] : no respiratory distress  [No Accessory Muscle Use] : no accessory muscle use [Clear to Auscultation] : lungs were clear to auscultation bilaterally [Normal Rate] : normal rate  [Regular Rhythm] : with a regular rhythm [Normal S1, S2] : normal S1 and S2 [No Murmur] : no murmur heard [No Carotid Bruits] : no carotid bruits [No Abdominal Bruit] : a ~M bruit was not heard ~T in the abdomen [No Varicosities] : no varicosities [Pedal Pulses Present] : the pedal pulses are present [No Edema] : there was no peripheral edema [No Palpable Aorta] : no palpable aorta [No Extremity Clubbing/Cyanosis] : no extremity clubbing/cyanosis [Soft] : abdomen soft [Non Tender] : non-tender [Non-distended] : non-distended [No Masses] : no abdominal mass palpated [No HSM] : no HSM [Normal Bowel Sounds] : normal bowel sounds [Normal Posterior Cervical Nodes] : no posterior cervical lymphadenopathy [Normal Anterior Cervical Nodes] : no anterior cervical lymphadenopathy [No CVA Tenderness] : no CVA  tenderness [No Spinal Tenderness] : no spinal tenderness [No Joint Swelling] : no joint swelling [Grossly Normal Strength/Tone] : grossly normal strength/tone [No Rash] : no rash [Coordination Grossly Intact] : coordination grossly intact [No Focal Deficits] : no focal deficits [Normal Gait] : normal gait [Deep Tendon Reflexes (DTR)] : deep tendon reflexes were 2+ and symmetric [Normal Affect] : the affect was normal [Normal Insight/Judgement] : insight and judgment were intact

## 2022-05-02 NOTE — HISTORY OF PRESENT ILLNESS
[FreeTextEntry1] : Annual [de-identified] : Ms. MRAVIN MARTINEZ is a 27 year old female presenting for an annual.\par Seen by GYn Dr Batres 3/21.\par Not on OCP`s now. Getting  this year.\par Using condoms.\par Seen by Dermatologist Dr Garcia for hyperhidrosis. Had Botox helped a little going this week for 2nd round.

## 2022-05-02 NOTE — ASSESSMENT
[FreeTextEntry1] : Annual\par SBIRT negative\par Depression screen negative\par Check comprehensive labs, in office today\par Vaccines \par Adacel 2017\par Flu 9/2021\par COVID booster declined\par \par GYN\par  pap up to date 8/20\par Works as RN in [employer].\par Seen by GYn Dr Batres 3/21.\par Not on OCP`s now. Getting  this year.\par Using condoms.\par Seen by Dermatologist Dr Garcia for hyperhidrosis. Had Botox helped a little going this week for 2nd round.\par Follow up annual.

## 2022-05-02 NOTE — HEALTH RISK ASSESSMENT
[Good] : ~his/her~  mood as  good [Never] : Never [Yes] : Yes [2 - 4 times a month (2 pts)] : 2-4 times a month (2 points) [1 or 2 (0 pts)] : 1 or 2 (0 points) [Never (0 pts)] : Never (0 points) [No] : In the past 12 months have you used drugs other than those required for medical reasons? No [0] : 2) Feeling down, depressed, or hopeless: Not at all (0) [PHQ-2 Negative - No further assessment needed] : PHQ-2 Negative - No further assessment needed [Audit-CScore] : 2 [[id number]] : 0 [Patient reported PAP Smear was normal] : Patient reported PAP Smear was normal [HIV Test offered] : HIV Test offered [Hepatitis C test offered] : Hepatitis C test offered [Behavioral] : behavioral [With Significant Other] : lives with significant other [Employed] : employed [] :  [Sexually Active] : sexually active [High Risk Behavior] : no high risk behavior [Feels Safe at Home] : Feels safe at home [Reports changes in hearing] : Reports no changes in hearing [Reports changes in vision] : Reports no changes in vision [Reports changes in dental health] : Reports no changes in dental health [Smoke Detector] : smoke detector [Carbon Monoxide Detector] : carbon monoxide detector [Seat Belt] :  uses seat belt [Sunscreen] : uses sunscreen [TB Exposure] : is not being exposed to tuberculosis [PapSmearDate] : 8/2020 [Reviewed no changes] : Reviewed, no changes [Name: ___] : Health Care Proxy's Name: [unfilled]  [Relationship: ___] : Relationship: [unfilled] [AdvancecareDate] : 5/2022

## 2022-05-05 ENCOUNTER — TRANSCRIPTION ENCOUNTER (OUTPATIENT)
Age: 27
End: 2022-05-05

## 2022-05-05 DIAGNOSIS — R94.6 ABNORMAL RESULTS OF THYROID FUNCTION STUDIES: ICD-10-CM

## 2022-05-05 LAB
25(OH)D3 SERPL-MCNC: 16.1 NG/ML
ALBUMIN SERPL ELPH-MCNC: 4.6 G/DL
ALP BLD-CCNC: 62 U/L
ALT SERPL-CCNC: 12 U/L
ANION GAP SERPL CALC-SCNC: 11 MMOL/L
APPEARANCE: CLEAR
AST SERPL-CCNC: 17 U/L
BASOPHILS # BLD AUTO: 0.02 K/UL
BASOPHILS NFR BLD AUTO: 0.4 %
BILIRUB SERPL-MCNC: 0.9 MG/DL
BILIRUBIN URINE: NEGATIVE
BLOOD URINE: NEGATIVE
BUN SERPL-MCNC: 8 MG/DL
C TRACH RRNA SPEC QL NAA+PROBE: NOT DETECTED
CALCIUM SERPL-MCNC: 9.4 MG/DL
CHLORIDE SERPL-SCNC: 103 MMOL/L
CHOLEST SERPL-MCNC: 185 MG/DL
CO2 SERPL-SCNC: 26 MMOL/L
COLOR: YELLOW
CREAT SERPL-MCNC: 0.59 MG/DL
EGFR: 126 ML/MIN/1.73M2
EOSINOPHIL # BLD AUTO: 0.04 K/UL
EOSINOPHIL NFR BLD AUTO: 0.9 %
GLUCOSE QUALITATIVE U: NEGATIVE
GLUCOSE SERPL-MCNC: 97 MG/DL
HBV SURFACE AG SER QL: NONREACTIVE
HCT VFR BLD CALC: 42.5 %
HCV AB SER QL: NONREACTIVE
HCV S/CO RATIO: 0.12 S/CO
HDLC SERPL-MCNC: 58 MG/DL
HGB BLD-MCNC: 13.5 G/DL
HIV1+2 AB SPEC QL IA.RAPID: NONREACTIVE
IMM GRANULOCYTES NFR BLD AUTO: 0 %
KETONES URINE: NEGATIVE
LDLC SERPL CALC-MCNC: 113 MG/DL
LEUKOCYTE ESTERASE URINE: NEGATIVE
LYMPHOCYTES # BLD AUTO: 1.74 K/UL
LYMPHOCYTES NFR BLD AUTO: 38.1 %
MAN DIFF?: NORMAL
MCHC RBC-ENTMCNC: 30 PG
MCHC RBC-ENTMCNC: 31.8 GM/DL
MCV RBC AUTO: 94.4 FL
MONOCYTES # BLD AUTO: 0.48 K/UL
MONOCYTES NFR BLD AUTO: 10.5 %
N GONORRHOEA RRNA SPEC QL NAA+PROBE: NOT DETECTED
NEUTROPHILS # BLD AUTO: 2.29 K/UL
NEUTROPHILS NFR BLD AUTO: 50.1 %
NITRITE URINE: NEGATIVE
NONHDLC SERPL-MCNC: 128 MG/DL
PH URINE: 8
PLATELET # BLD AUTO: 406 K/UL
POTASSIUM SERPL-SCNC: 3.8 MMOL/L
PROT SERPL-MCNC: 7 G/DL
PROTEIN URINE: NEGATIVE
RBC # BLD: 4.5 M/UL
RBC # FLD: 12.9 %
SODIUM SERPL-SCNC: 140 MMOL/L
SOURCE AMPLIFICATION: NORMAL
SPECIFIC GRAVITY URINE: 1.02
TRIGL SERPL-MCNC: 73 MG/DL
TSH SERPL-ACNC: 0.05 UIU/ML
URATE SERPL-MCNC: 4.1 MG/DL
UROBILINOGEN URINE: ABNORMAL
WBC # FLD AUTO: 4.57 K/UL

## 2022-05-19 ENCOUNTER — NON-APPOINTMENT (OUTPATIENT)
Age: 27
End: 2022-05-19

## 2022-06-30 ENCOUNTER — TRANSCRIPTION ENCOUNTER (OUTPATIENT)
Age: 27
End: 2022-06-30

## 2022-06-30 ENCOUNTER — APPOINTMENT (OUTPATIENT)
Dept: DERMATOLOGY | Facility: CLINIC | Age: 27
End: 2022-06-30

## 2022-06-30 DIAGNOSIS — L74.510 PRIMARY FOCAL HYPERHIDROSIS, AXILLA: ICD-10-CM

## 2022-06-30 PROCEDURE — 64650 CHEMODENERV ECCRINE GLANDS: CPT

## 2022-10-07 ENCOUNTER — APPOINTMENT (OUTPATIENT)
Dept: OBGYN | Facility: CLINIC | Age: 27
End: 2022-10-07

## 2022-10-07 VITALS
WEIGHT: 112 LBS | BODY MASS INDEX: 25.19 KG/M2 | SYSTOLIC BLOOD PRESSURE: 110 MMHG | DIASTOLIC BLOOD PRESSURE: 60 MMHG | HEIGHT: 56 IN

## 2022-10-07 PROCEDURE — 99395 PREV VISIT EST AGE 18-39: CPT

## 2022-10-09 ENCOUNTER — NON-APPOINTMENT (OUTPATIENT)
Age: 27
End: 2022-10-09

## 2022-10-10 ENCOUNTER — APPOINTMENT (OUTPATIENT)
Dept: GASTROENTEROLOGY | Facility: CLINIC | Age: 27
End: 2022-10-10

## 2022-10-10 VITALS
SYSTOLIC BLOOD PRESSURE: 133 MMHG | HEIGHT: 56 IN | HEART RATE: 97 BPM | DIASTOLIC BLOOD PRESSURE: 72 MMHG | OXYGEN SATURATION: 100 % | BODY MASS INDEX: 25.64 KG/M2 | WEIGHT: 114 LBS

## 2022-10-10 DIAGNOSIS — D50.9 IRON DEFICIENCY ANEMIA, UNSPECIFIED: ICD-10-CM

## 2022-10-10 DIAGNOSIS — R79.89 OTHER SPECIFIED ABNORMAL FINDINGS OF BLOOD CHEMISTRY: ICD-10-CM

## 2022-10-10 DIAGNOSIS — K21.9 GASTRO-ESOPHAGEAL REFLUX DISEASE W/OUT ESOPHAGITIS: ICD-10-CM

## 2022-10-10 PROCEDURE — 99204 OFFICE O/P NEW MOD 45 MIN: CPT

## 2022-10-10 RX ORDER — ERGOCALCIFEROL 1.25 MG/1
1.25 MG CAPSULE, LIQUID FILLED ORAL
Qty: 12 | Refills: 0 | Status: DISCONTINUED | COMMUNITY
Start: 2020-06-17 | End: 2022-10-10

## 2022-10-10 NOTE — ASSESSMENT
[FreeTextEntry1] : Impression: The patient is a very pleasant 27 year old woman who is referred for an initial Gastroenterology consultation for the evaluation of progressive typical reflux symptoms, including heartburn and at times painful acid regurgitation. In addition, she has had lower abdominal pain with alternating diarrhea and fecal retention in addition to bouts of blood per rectum.\par \par Plan:\par \par 1) Gastroenterology: Patient with gastroesophageal reflux complicated by pain acid regurgitation. Given exposure risk and family history, will need EGD. Will obtain gastric biopsies to look for H Pylroi infection. \par *In addition, she will need colonoscopy to look for IBD, proctitis, and even infiltrative process as an underlying cause for her lower GI symptoms.  These include the fecal urgency bleeding and pain.\par I explained to the patient the risks, alternatives and benefits to a colonoscopy. Risk including but not limited to bleeding, perforation, infection adverse medication reaction. Questions were answered. agreeable to proceed with the planned procedures. \par \par Blood work will be performed today. \par \par \par 2) Disposition: Ольга will return to see me in 4-6 weeks after the above studies are obtained. My office will be in touch with her as results become available.  \par \par In the meantime, she should not hesitate to contact my office with any additional questions.\par

## 2022-10-10 NOTE — PHYSICAL EXAM
[Alert] : alert [Normal Voice/Communication] : normal voice/communication [Healthy Appearing] : healthy appearing [No Acute Distress] : no acute distress [Sclera] : the sclera and conjunctiva were normal [Hearing Threshold Finger Rub Not Mower] : hearing was normal [Normal Lips/Gums] : the lips and gums were normal [Oropharynx] : the oropharynx was normal [Normal Appearance] : the appearance of the neck was normal [No Neck Mass] : no neck mass was observed [No Respiratory Distress] : no respiratory distress [No Acc Muscle Use] : no accessory muscle use [Respiration, Rhythm And Depth] : normal respiratory rhythm and effort [Auscultation Breath Sounds / Voice Sounds] : lungs were clear to auscultation bilaterally [Heart Rate And Rhythm] : heart rate was normal and rhythm regular [Normal S1, S2] : normal S1 and S2 [Murmurs] : no murmurs [Bowel Sounds] : normal bowel sounds [No Masses] : no abdominal mass palpated [Abdomen Soft] : soft [] : no hepatosplenomegaly [Abnormal Walk] : normal gait [No Joint Swelling] : no joint swelling seen [Normal Color / Pigmentation] : normal skin color and pigmentation [Oriented To Time, Place, And Person] : oriented to person, place, and time [de-identified] : minimal tenderness diffuse lower abdomen

## 2022-10-10 NOTE — HISTORY OF PRESENT ILLNESS
[FreeTextEntry1] : Ольга is a very pleasant 27-year-old female who presents for initial evaluation, referred by Dr. Rita Berry.  Patient has had intermittent bouts of abdominal pain over the past several years with increasing frequency and intensity.  She reports lower abdominal cramping pain.  Not associated with diarrhea necessarily, although she does have intermittent bouts of fecal urgency and liquid stool.  In addition, she reports occasional blood per rectum mainly on wiping.  Also with chronic reflux, worsening symptoms over the past few years.  Progressive dyspepsia epigastric discomfort and burning associated with acid regurgitation.  Denies postprandial symptoms.  Reports bloating and abdominal discomfort at times associated with menstrual cycle.  However, most recently, happening more frequently and unrelated to activity, meals, menses.  Reports that her father has had GI disease, she is unclear as to whether any first-degree relatives have had colon polyps.  No cancer.  She does have multiple family members with peptic ulcer disease.  She was born in [facility].

## 2022-10-10 NOTE — CONSULT LETTER
[Dear  ___] : Dear  [unfilled], [Consult Letter:] : I had the pleasure of evaluating your patient, [unfilled]. [Consult Closing:] : Thank you very much for allowing me to participate in the care of this patient.  If you have any questions, please do not hesitate to contact me. [Sincerely,] : Sincerely, [FreeTextEntry1] : Impression: The patient is a very pleasant 27 year old woman who is referred for an initial Gastroenterology consultation for the evaluation of progressive typical reflux symptoms, including heartburn and at times painful acid regurgitation. In addition, she has had lower abdominal pain with alternating diarrhea and fecal retention in addition to bouts of blood per rectum.\par \par Plan:\par \par 1) Gastroenterology: Patient with gastroesophageal reflux complicated by pain acid regurgitation. Given exposure risk and family history, will need EGD. Will obtain gastric biopsies to look for H Pylroi infection. \par *In addition, she will need colonoscopy to look for IBD, proctitis, and even infiltrative process as an underlying cause for her lower GI symptoms.  These include the fecal urgency bleeding and pain.\par I explained to the patient the risks, alternatives and benefits to a colonoscopy. Risk including but not limited to bleeding, perforation, infection adverse medication reaction. Questions were answered. agreeable to proceed with the planned procedures. \par \par Blood work will be performed today. \par \par \par 2) Disposition: Ольга will return to see me in 4-6 weeks after the above studies are obtained. My office will be in touch with her as results become available.  \par \par In the meantime, she should not hesitate to contact my office with any additional questions. [FreeTextEntry3] : Jennifer Iqbal MD\par Gastroenterology, Hepatology and Motility\par

## 2022-10-10 NOTE — REASON FOR VISIT
[Initial Evaluation] : an initial evaluation [FreeTextEntry1] : Iron deficiency anemia, abdominal pain, bouts of bleeding per rectum mainly on wiping, intermittent diarrhea, bloating and discomfort of left lower quadrant refractory GERD, epigastric burning and acid regurgitation

## 2022-10-11 ENCOUNTER — TRANSCRIPTION ENCOUNTER (OUTPATIENT)
Age: 27
End: 2022-10-11

## 2022-10-11 ENCOUNTER — NON-APPOINTMENT (OUTPATIENT)
Age: 27
End: 2022-10-11

## 2022-10-11 LAB
CRP SERPL-MCNC: <3 MG/L
FERRITIN SERPL-MCNC: 77 NG/ML
HBV SURFACE AB SERPL IA-ACNC: 133.4 MIU/ML
IGA SER QL IEP: 280 MG/DL
IRON SATN MFR SERPL: 19 %
IRON SERPL-MCNC: 76 UG/DL
TIBC SERPL-MCNC: 388 UG/DL
TSH SERPL-ACNC: 4.02 UIU/ML
UIBC SERPL-MCNC: 312 UG/DL
VIT B12 SERPL-MCNC: 553 PG/ML

## 2022-10-12 LAB
CYTOLOGY CVX/VAG DOC THIN PREP: ABNORMAL
ENDOMYSIUM IGA SER QL: NEGATIVE
ENDOMYSIUM IGA TITR SER: NORMAL
GLIADIN IGA SER QL: 7.8 UNITS
GLIADIN IGG SER QL: <5 UNITS
GLIADIN PEPTIDE IGA SER-ACNC: NEGATIVE
GLIADIN PEPTIDE IGG SER-ACNC: NEGATIVE

## 2022-10-16 LAB
TTG IGA SER IA-ACNC: <1.2 U/ML
TTG IGA SER-ACNC: NEGATIVE
TTG IGG SER IA-ACNC: <1.2 U/ML
TTG IGG SER IA-ACNC: NEGATIVE

## 2022-10-17 ENCOUNTER — NON-APPOINTMENT (OUTPATIENT)
Age: 27
End: 2022-10-17

## 2022-10-18 ENCOUNTER — TRANSCRIPTION ENCOUNTER (OUTPATIENT)
Age: 27
End: 2022-10-18

## 2022-10-20 ENCOUNTER — NON-APPOINTMENT (OUTPATIENT)
Age: 27
End: 2022-10-20

## 2022-10-24 ENCOUNTER — FORM ENCOUNTER (OUTPATIENT)
Age: 27
End: 2022-10-24

## 2022-10-25 ENCOUNTER — RESULT REVIEW (OUTPATIENT)
Age: 27
End: 2022-10-25

## 2022-10-25 ENCOUNTER — APPOINTMENT (OUTPATIENT)
Dept: GASTROENTEROLOGY | Facility: AMBULATORY MEDICAL SERVICES | Age: 27
End: 2022-10-25

## 2022-10-25 PROCEDURE — 45385 COLONOSCOPY W/LESION REMOVAL: CPT

## 2022-10-25 PROCEDURE — 45380 COLONOSCOPY AND BIOPSY: CPT | Mod: 59

## 2022-10-25 PROCEDURE — 43239 EGD BIOPSY SINGLE/MULTIPLE: CPT | Mod: 59

## 2022-10-27 ENCOUNTER — APPOINTMENT (OUTPATIENT)
Dept: DERMATOLOGY | Facility: CLINIC | Age: 27
End: 2022-10-27

## 2022-11-04 ENCOUNTER — TRANSCRIPTION ENCOUNTER (OUTPATIENT)
Age: 27
End: 2022-11-04

## 2022-11-10 ENCOUNTER — NON-APPOINTMENT (OUTPATIENT)
Age: 27
End: 2022-11-10

## 2022-11-28 ENCOUNTER — APPOINTMENT (OUTPATIENT)
Dept: GASTROENTEROLOGY | Facility: CLINIC | Age: 27
End: 2022-11-28

## 2022-11-28 VITALS
WEIGHT: 114 LBS | HEIGHT: 56 IN | BODY MASS INDEX: 25.64 KG/M2 | SYSTOLIC BLOOD PRESSURE: 102 MMHG | OXYGEN SATURATION: 98 % | DIASTOLIC BLOOD PRESSURE: 68 MMHG | HEART RATE: 98 BPM

## 2022-11-28 DIAGNOSIS — K21.9 GASTRO-ESOPHAGEAL REFLUX DISEASE W/OUT ESOPHAGITIS: ICD-10-CM

## 2022-11-28 DIAGNOSIS — R10.32 LEFT LOWER QUADRANT PAIN: ICD-10-CM

## 2022-11-28 DIAGNOSIS — K62.5 HEMORRHAGE OF ANUS AND RECTUM: ICD-10-CM

## 2022-11-28 DIAGNOSIS — R10.9 UNSPECIFIED ABDOMINAL PAIN: ICD-10-CM

## 2022-11-28 DIAGNOSIS — R79.89 OTHER SPECIFIED ABNORMAL FINDINGS OF BLOOD CHEMISTRY: ICD-10-CM

## 2022-11-28 PROCEDURE — 99215 OFFICE O/P EST HI 40 MIN: CPT

## 2022-11-28 NOTE — HISTORY OF PRESENT ILLNESS
[FreeTextEntry1] : Jose is a very pleasant 27-year-old female who presents for follow-up today.  She reports that her schedule has changed and therefore her eating habits have improved, subsequent improvement in bowel habits as well.  She does have episodes of bloating dyspepsia and abdominal discomfort.  She had an upper endoscopy and colonoscopy.  EGD showed erosive gastritis with biopsy confirming H. pylori infection.  She was unable to complete the treatment due to intense nausea and vomiting, inability to eat.  Has been avoiding NSAIDs.  Continues to have issues with her energy level.

## 2022-11-28 NOTE — REASON FOR VISIT
[Follow-up] : a follow-up of an existing diagnosis [FreeTextEntry1] : dyspepsia, age pylori gastritis and gastric erosions, constipation, bloating, dyspepsia

## 2022-11-28 NOTE — ASSESSMENT
[FreeTextEntry1] : Jose is a very pleasant 27-year-old female presenting for follow-up for a constellation of GI symptoms.  She reports that her fecal retention and lower GI symptoms have improved with dietary changes and lifestyle modifications, mainly with change in work schedule.  However, she continues to have abdominal discomfort, bloating, and dyspepsia.  She took part of the treatment for H. pylori gastritis.  She developed intense nausea vomiting and inability to eat or drink with the Flagyl, and was forced to stop after about a week.  She did try Zofran with minimal relief during the treatment course.\par \par I discussed the just with Jose the options for confirmation of eradication.  She will have a urea breath test for the age pylori in mid to late December.  If positive, she may require stool testing for antimicrobial sensitivity to determine best course of action.  She will have a follow-up after test results are available.  Lifestyle modifications reinforced.\par \par Reviewed and reconciled medications, allergies, PMHx, PSHx, SocHx, FMHx. Reviewed imaging, blood work, diagnostic testing, discussed with patient. Further recommendations pending results of above work up and evaluation.\par

## 2022-11-28 NOTE — CONSULT LETTER
[Dear  ___] : Dear  [unfilled], [Courtesy Letter:] : I had the pleasure of seeing your patient, [unfilled], in my office today. [Consult Closing:] : Thank you very much for allowing me to participate in the care of this patient.  If you have any questions, please do not hesitate to contact me. [Sincerely,] : Sincerely, [FreeTextEntry1] : Jose is a very pleasant 27-year-old female presenting for follow-up for a constellation of GI symptoms.  She reports that her fecal retention and lower GI symptoms have improved with dietary changes and lifestyle modifications, mainly with change in work schedule.  However, she continues to have abdominal discomfort, bloating, and dyspepsia.  She took part of the treatment for H. pylori gastritis.  She developed intense nausea vomiting and inability to eat or drink with the Flagyl, and was forced to stop after about a week.  She did try Zofran with minimal relief during the treatment course.\par \par I discussed the just with Jose the options for confirmation of eradication.  She will have a urea breath test for the age pylori in mid to late December.  If positive, she may require stool testing for antimicrobial sensitivity to determine best course of action.  She will have a follow-up after test results are available.  Lifestyle modifications reinforced.\par \par Reviewed and reconciled medications, allergies, PMHx, PSHx, SocHx, FMHx. Reviewed imaging, blood work, diagnostic testing, discussed with patient. Further recommendations pending results of above work up and evaluation.\par  [FreeTextEntry3] : Jennifer Iqbal MD\par Gastroenterology, Hepatology and Motility\par

## 2022-12-06 ENCOUNTER — TRANSCRIPTION ENCOUNTER (OUTPATIENT)
Age: 27
End: 2022-12-06

## 2022-12-14 LAB — UREA BREATH TEST QL: POSITIVE

## 2023-02-02 ENCOUNTER — NON-APPOINTMENT (OUTPATIENT)
Age: 28
End: 2023-02-02

## 2023-02-16 NOTE — ED ADULT NURSE NOTE - NS ED NURSE RECORD ANOTHER HT AND WT
[facility]  [place] INPATIENT ENCOUNTER  PHYSICAL MEDICINE AND REHABILITATION  DAILY PROGRESS NOTE    ADMISSION DATE:  2/13/2023  DATE:  2/16/2023  CURRENT HOSPITAL DAY:  Hospital Day: 4  ATTENDING PHYSICIAN:  Kashmir Guerrero MD  CODE STATUS:  Full Resuscitation    CHIEF COMPLAINT:  <principal problem not specified>    INTERVAL HISTORY:    Augustin Mckenna Jr. is a 75 year old male patient admitted with Incomplete quadriplegia at C5-6 level (CMS/[facility]) [G82.54]     HPI: Chart was reviewed.  HPI was discussed with nursing staff.  No new neurological changes are reported.  States he is feeling well. Pleased with his discharge date, although he also states he wishes it was sooner. Pt noted to lack insight into his condition despite multiple staff giving education. Pt states he cannot go home with is RUE not working. Discussed again that the recovery of his nerve is going to take time (months or more) and the goal of rehab is to get him more functional within his current limitations. Pt not completely receptive to this when discussing learning to use his opposite arm/hand more effectively. Staff all to continue to educate patient on recovery of RUE strength/function will take some time.     MEDICATIONS:    The medication list was reviewed today.   • docusate sodium-sennosides (SENOKOT S) 50-8.6 MG 2 tablet Oral Nightly   • finasteride (PROSCAR) tablet 5 mg Oral Daily   • gabapentin (NEURONTIN) capsule 100 mg Oral 3 times per day   • polyethylene glycol (MIRALAX) packet 17 g Oral Daily   • tamsulosin (FLOMAX) capsule 0.4 mg Oral BID   • thiamine (VITAMIN B1) tablet 100 mg Oral Daily   • digoxin (LANOXIN) tablet 125 mcg Oral Daily   • enoxaparin (LOVENOX) injection 40 mg Subcutaneous Daily   • metoPROLOL succinate (TOPROL-XL) ER tablet 25 mg Oral Daily         • bisacodyl (DULCOLAX) suppository 10 mg  10 mg Rectal Daily PRN   • melatonin tablet 3 mg  3 mg Oral Nightly PRN   • acetaminophen (TYLENOL) tablet 1,000 mg  1,000 mg  Oral Q8H PRN   • aluminum-magnesium hydroxide-simethicone (MAALOX) 200-200-20 MG/5ML suspension 30 mL  30 mL Oral Q4H PRN   • cyclobenzaprine (FLEXERIL) tablet 5 mg  5 mg Oral TID PRN   • HYDROcodone-acetaminophen (NORCO)  MG per tablet 1 tablet  1 tablet Oral Q4H PRN   • magnesium hydroxide (MILK OF MAGNESIA) 400 MG/5ML suspension 30 mL  30 mL Oral Daily PRN   • nitroGLYCERIN (NITROSTAT) sublingual tablet 0.4 mg  0.4 mg Sublingual Q5 Min PRN   • ondansetron (ZOFRAN ODT) disintegrating tablet 4 mg  4 mg Oral Q6H PRN   • HYDROcodone-acetaminophen (NORCO) 5-325 MG per tablet 1 tablet  1 tablet Oral Q4H PRN       REVIEW OF SYSTEMS:  Review of Systems   Constitutional: Negative for chills and fever.   Eyes: Negative for blurred vision.   Respiratory: Negative for cough and shortness of breath.    Cardiovascular: Negative for chest pain.   Gastrointestinal: Negative for abdominal pain, constipation, nausea and vomiting.   Genitourinary: Negative for dysuria.   Musculoskeletal: Positive for neck pain.   Neurological: Positive for tingling, focal weakness and weakness. Negative for headaches.   Psychiatric/Behavioral: The patient does not have insomnia.          OBJECTIVE:    VITAL SIGNS:     Vital Last Value 24 Hour Range   Temperature 98.2 °F (36.8 °C) (02/16/23 0502) Temp  Min: 97.9 °F (36.6 °C)  Max: 98.2 °F (36.8 °C)   Pulse 84 (02/16/23 0804) Pulse  Min: 67  Max: 84   Respiratory 18 (02/15/23 1809) Resp  Min: 18  Max: 18   Non-Invasive  Blood Pressure 99/65 (02/16/23 0804) BP  Min: 94/62  Max: 112/69   Pulse Oximetry 96 % (02/16/23 0502) SpO2  Min: 96 %  Max: 99 %     Vital Today Admitted   Weight 84.2 kg (185 lb 9.6 oz) (02/13/23 1751) Weight: 84.2 kg (185 lb 9.6 oz) (02/13/23 1751)       INTAKE/OUTPUT:      Intake/Output Summary (Last 24 hours) at 2/16/2023 0916  Last data filed at 2/16/2023 0615  Gross per 24 hour   Intake --   Output 1100 ml   Net -1100 ml       Bowel: Stool Amount: Small (02/15/23  1900)  Stool Occurrence: 1 (02/15/23 1900)     Bladder PVR: Bladder Scan  Reason for Scan: per order;Post void evaluation (02/16/23 0430)  Bladder Scanned Volume (mL): 1 mL (02/16/23 0430)    PHYSICAL EXAMINATION:  Physical Exam  Vitals and nursing note reviewed.   Constitutional:       General: He is not in acute distress.     Appearance: Normal appearance. He is not ill-appearing.   HENT:      Head: Normocephalic and atraumatic.      Mouth/Throat:      Mouth: Mucous membranes are moist.   Eyes:      Extraocular Movements: Extraocular movements intact.   Cardiovascular:      Rate and Rhythm: Normal rate.   Pulmonary:      Effort: Pulmonary effort is normal. No respiratory distress.   Abdominal:      General: Abdomen is flat. There is no distension.      Palpations: Abdomen is soft.   Musculoskeletal:      Right lower leg: No edema.      Left lower leg: No edema.   Skin:     General: Skin is warm and dry.   Neurological:      Mental Status: He is alert and oriented to person, place, and time.      Motor: Weakness present.      Gait: Gait abnormal.   Psychiatric:         Mood and Affect: Mood normal.         Current Functional Status:     Mobility (since 2/14/2023)     rolling left  stand by assist; with verbal cues    rolling right  stand by assist; with verbal cues    supine to sit  stand by assist    sit to supine  stand by assist    sit to stand  stand by assist; with verbal cues    stand to sit  stand by assist; with verbal cues    stand pivot  contact guard/touching/steadying assist; with verbal cues    car  minimal assist; with tactile cues; with verbal cues    transfers assistive devices  2-wheeled walker    toilet transfer  gait belt    gait assistance  minimal assist; contact guard/touching/steadying assist (P)     1st trial  180 (P)     2nd trial  180 (P)     gait surface  even (P)     gait assistance trial 2  minimal assist; with verbal cues; with tactile cues    gait AD (trial 2)  gait belt; two-wheeled  walker; single point cane    1st trial (trial 2)  100    2nd trial (trial 2)  150    gait type (trial 2)  unsteady; shuffling; step through; step to; decreased jenise/pace    gait surface (trial 2)  even    number of stairs, trial 1  4    number of stairs, trial 2  12    stair assistance  contact guard/touching/steadying assist; with tactile cues; with verbal cues    stair rails  left rail only    stair pattern  step to; reciprocal          Self Care/ADL (since 2/14/2023)     eating assist  stand by assist  Patient completes c LUE    eating position  wheelchair    eating deficit  set up; verbal cueing; supervision/safety; increased time to complete    grooming assist  minimal assist    oral hygiene assist  minimal assist    oral hygiene position  wheelchair    grooming/oral hygiene defict  set up; steadying; supervision/safety; increased time to complete; verbal cueing; wash/dry face; teeth care; applying deoderant    upper body dressing assist  maximal assist    upper body dressing assist  maximal assist    upper body dressing  edge of bed    upper body dressing deficit  set up; steadying; verbal cueing; supervision/safety; increased time to complete; pull over head; thread left upper extermity; thread right upper extermity    lower body dressing assist  total assist - non-dependent    lower body dressing position  supine/bed    footwear assist  total assist - non-dependent    footware position  supine/bed    lower body dressing deficit  set up; steadying; verbal cueing; supervision/safety; increased time to complete; tie shoes; don/doff left sock; don/doff right sock; pull up over hips; don/doff left shoe; don/doff right shoe; thread left lower extremity into pants; thread right lower extremity into pants    toileting assist  total assist - non-dependent    toileting deficit  set up; steadying; verbal cueing; increased time to complete; supervision/safety; clothing management up; clothing management down;  perineal hygiene    bathing assist  maximal assist    bathing position  supine/bed    bathing deficit  set up; steadying; verbal cueing; supervision/safety; increased time to complete; left arm; buttocks; left lower leg including foot; right lower leg including foot; right arm    pt activity tolerance  1 to 1 activity to rest          Communication/Cognition (since 2/14/2023)     Affect / Behavior  cooperative; pleasant    form of communication  verbal (P)     attention span  - difficulty attending to directions; - difficulty dividing attention    attention impairments  perseveration; distractibility    following commands  follows one step commands with repetition    Safety Judgement  decreased awareness of need for assistance (P)     Awareness of Deficits  assistance required to compensate for deficits (P)           LABORATORY DATA:    Recent Labs   Lab 02/14/23  0510   WBC 5.8   RBC 3.50*   HGB 10.1*   HCT 31.8*   MCV 90.9   MCH 28.9   MCHC 31.8*   RDWCV 13.4      TLYMPH 18       Recent Labs   Lab 02/14/23  0510 02/13/23  0434   SODIUM 137 137   CHLORIDE 104 104   BUN 18 20   BCRAT 29* 32*   POTASSIUM 4.0 3.9   GLUCOSE 94 92   CREATININE 0.62* 0.63*   CALCIUM 8.8 8.5       No results found    No results found    IMAGING STUDIES:   No orders to display         ASSESSMENT/PLAN:     1. NTSCI/ incomplete quadriplegia   2. Cervical stenosis with myelopathy   3. S/p cervical decompression and fusion C3-C6 1/31  4. Neurogenic bladder -chronic   5. Neurogenic bowel - chronic   6. H/o Afib -not on AC d/t h/o GIB   7. H/o HTN   8. H/o CHF   9. Fall risk  10. DVT risk   11. H/o tobacco abuse   12. Severe Malnutrition  13. Partial thickness pressure injury -coccyx (POA)       IMPAIRMENT GROUP CODE: 4.1211    NTSCI/ incomplete quadriplegia   Cervical stenosis with myelopathy   - S/p cervical decompression and fusion C3-C6 1/31/23  - PT/OT to increase mobility, coordination, balance, endurance, self care      Pain  management   - Tylenol, Flexeril, Norco as needed   - Gabapentin - titrate as needed      Neurogenic bladder - chronic   Neurogenic bowel - chronic   - bowel and bladder program per unit protocol     H/o Afib   H/o HTN / H/o CHF   - not on AC d/t h/o GIB   - continued on Digoxin, Toprol  - continue don Spironolactone  - Appreciate hospitalist and cardiology following      ID: monitor for signs/symptoms of infection      CARD: Monitor cardiac status with increased activity      PULM: Monitor respiratory status with increased activity      HEME: intermittent monitoring       GI:   on bowel program.     :  on bladder program.     SKIN: Nursing to monitor for any issues with skin integrity.     VASC: Continue Lovenox at prophylactic dosage.     ELECTROLYTES:  Will monitor and make adjustments as needed.      NUTRITION: RD following for malnutrition   DISPOSITION: Tentative discharge date 2/25/23        TIME SPENT:    I spent 35 minutes on this case with counseling and coordination care >50% including multi-disciplinary team conference; discharge plans discussed with patient, , RN/Physical therapy/Occupational therapy     Yes

## 2023-02-17 ENCOUNTER — APPOINTMENT (OUTPATIENT)
Dept: GASTROENTEROLOGY | Facility: CLINIC | Age: 28
End: 2023-02-17
Payer: COMMERCIAL

## 2023-02-17 VITALS — DIASTOLIC BLOOD PRESSURE: 70 MMHG | HEART RATE: 85 BPM | OXYGEN SATURATION: 99 % | SYSTOLIC BLOOD PRESSURE: 110 MMHG

## 2023-02-17 PROCEDURE — 99214 OFFICE O/P EST MOD 30 MIN: CPT

## 2023-03-11 ENCOUNTER — TRANSCRIPTION ENCOUNTER (OUTPATIENT)
Age: 28
End: 2023-03-11

## 2023-03-23 ENCOUNTER — TRANSCRIPTION ENCOUNTER (OUTPATIENT)
Age: 28
End: 2023-03-23

## 2023-03-24 ENCOUNTER — TRANSCRIPTION ENCOUNTER (OUTPATIENT)
Age: 28
End: 2023-03-24

## 2023-03-24 DIAGNOSIS — B37.0 CANDIDAL ESOPHAGITIS: ICD-10-CM

## 2023-03-24 DIAGNOSIS — B37.81 CANDIDAL ESOPHAGITIS: ICD-10-CM

## 2023-04-28 ENCOUNTER — APPOINTMENT (OUTPATIENT)
Dept: INTERNAL MEDICINE | Facility: CLINIC | Age: 28
End: 2023-04-28
Payer: COMMERCIAL

## 2023-04-28 ENCOUNTER — LABORATORY RESULT (OUTPATIENT)
Age: 28
End: 2023-04-28

## 2023-04-28 VITALS
WEIGHT: 116 LBS | TEMPERATURE: 97.9 F | HEIGHT: 56 IN | RESPIRATION RATE: 16 BRPM | HEART RATE: 87 BPM | DIASTOLIC BLOOD PRESSURE: 70 MMHG | BODY MASS INDEX: 26.1 KG/M2 | OXYGEN SATURATION: 98 % | SYSTOLIC BLOOD PRESSURE: 102 MMHG

## 2023-04-28 DIAGNOSIS — J20.9 ACUTE BRONCHITIS, UNSPECIFIED: ICD-10-CM

## 2023-04-28 DIAGNOSIS — R09.81 NASAL CONGESTION: ICD-10-CM

## 2023-04-28 DIAGNOSIS — Z00.00 ENCOUNTER FOR GENERAL ADULT MEDICAL EXAMINATION W/OUT ABNORMAL FINDINGS: ICD-10-CM

## 2023-04-28 PROCEDURE — 99395 PREV VISIT EST AGE 18-39: CPT | Mod: 25

## 2023-04-28 PROCEDURE — 36415 COLL VENOUS BLD VENIPUNCTURE: CPT

## 2023-04-28 RX ORDER — TETRACYCLINE HYDROCHLORIDE 500 MG/1
500 CAPSULE ORAL
Qty: 42 | Refills: 0 | Status: COMPLETED | COMMUNITY
Start: 2022-11-04 | End: 2023-04-28

## 2023-04-28 RX ORDER — SODIUM SULFATE, MAGNESIUM SULFATE, AND POTASSIUM CHLORIDE 17.75; 2.7; 2.25 G/1; G/1; G/1
1479-225-188 TABLET ORAL
Qty: 1 | Refills: 0 | Status: COMPLETED | COMMUNITY
Start: 2022-10-10 | End: 2023-04-28

## 2023-04-28 RX ORDER — METRONIDAZOLE 500 MG/1
500 TABLET ORAL
Qty: 56 | Refills: 0 | Status: COMPLETED | COMMUNITY
Start: 2022-11-04 | End: 2023-04-28

## 2023-04-28 RX ORDER — BISMUTH SUBCITRATE POTASSIUM, METRONIDAZOLE, AND TETRACYCLINE HYDROCHLORIDE 140; 125; 125 MG/1; MG/1; MG/1
140-125-125 CAPSULE ORAL
Qty: 120 | Refills: 0 | Status: COMPLETED | COMMUNITY
Start: 2023-02-17 | End: 2023-04-28

## 2023-04-28 RX ORDER — NYSTATIN 100000 [USP'U]/ML
100000 SUSPENSION ORAL
Qty: 200 | Refills: 0 | Status: COMPLETED | COMMUNITY
Start: 2023-03-24 | End: 2023-04-28

## 2023-04-28 RX ORDER — BISMUTH SUBSALICYLATE 262 MG/1
262 TABLET, CHEWABLE ORAL 4 TIMES DAILY
Qty: 112 | Refills: 0 | Status: COMPLETED | COMMUNITY
Start: 2022-11-04 | End: 2023-04-28

## 2023-04-28 RX ORDER — OMEPRAZOLE 20 MG/1
20 CAPSULE, DELAYED RELEASE ORAL TWICE DAILY
Qty: 28 | Refills: 0 | Status: COMPLETED | COMMUNITY
Start: 2023-02-17 | End: 2023-04-28

## 2023-04-28 RX ORDER — ONABOTULINUMTOXINA 100 [USP'U]/1
100 INJECTION, POWDER, LYOPHILIZED, FOR SOLUTION INTRADERMAL; INTRAMUSCULAR
Qty: 1 | Refills: 2 | Status: COMPLETED | COMMUNITY
Start: 2021-10-28 | End: 2023-04-28

## 2023-04-28 RX ORDER — BISMUTH SUBCITRATE POTASSIUM, METRONIDAZOLE, AND TETRACYCLINE HYDROCHLORIDE 140; 125; 125 MG/1; MG/1; MG/1
140-125-125 CAPSULE ORAL
Qty: 10 | Refills: 0 | Status: COMPLETED | COMMUNITY
Start: 2022-10-28 | End: 2023-04-28

## 2023-04-28 RX ORDER — OMEPRAZOLE MAGNESIUM 20 MG/1
20 TABLET, DELAYED RELEASE ORAL TWICE DAILY
Qty: 28 | Refills: 0 | Status: COMPLETED | COMMUNITY
Start: 2022-10-28 | End: 2023-04-28

## 2023-05-01 NOTE — ASSESSMENT
[FreeTextEntry1] : Annual:\par Ordered comprehensive blood work , screen for hyperlipidemia , diabetes and thyroid disorder.\par labs drawn in the office.\par will review the results and address if abnormal.\par she is up to date on PAP : result  normal.\par Received   flu vaccine\par up to date in  tdap,  vaccine.\par Encouraged / Received  COVID vaccine\par Declined for HIV and Hep C  screening test.\par alcohol screening :SIBRT:2\par Depression screening:PHQ2:0\par \par Acute bronchitis, congestion of nasal sinus:\par Prescribed azithromycin to 50 mg to 2 tablets day 1 and then 1 tablet/day for 4 days, mention 800 mg capsule 3 times a day as needed, Flonase nasal spray 2 spray in each nostril twice a day.\par Advised to increase water intake.\par

## 2023-05-01 NOTE — HISTORY OF PRESENT ILLNESS
[FreeTextEntry1] : Annual wellness exam [de-identified] : Ms. MARVIN YANG  is    28 year female . \par Patient stated he is he is having cough postnasal drainage for last 2 weeks her symptoms are ordered to monitor.  She denied any fever no shortness of breath.\par

## 2023-05-01 NOTE — PHYSICAL EXAM
[Well Nourished] : well nourished [Well Developed] : well developed [Well-Appearing] : well-appearing [Normal Sclera/Conjunctiva] : normal sclera/conjunctiva [Normal Outer Ear/Nose] : the outer ears and nose were normal in appearance [No JVD] : no jugular venous distention [No Lymphadenopathy] : no lymphadenopathy [Supple] : supple [No Respiratory Distress] : no respiratory distress  [No Accessory Muscle Use] : no accessory muscle use [Clear to Auscultation] : lungs were clear to auscultation bilaterally [Normal Rate] : normal rate  [Regular Rhythm] : with a regular rhythm [Normal S1, S2] : normal S1 and S2 [No Murmur] : no murmur heard [No Carotid Bruits] : no carotid bruits [No Abdominal Bruit] : a ~M bruit was not heard ~T in the abdomen [No Varicosities] : no varicosities [Pedal Pulses Present] : the pedal pulses are present [No Edema] : there was no peripheral edema [No Palpable Aorta] : no palpable aorta [No Extremity Clubbing/Cyanosis] : no extremity clubbing/cyanosis [Soft] : abdomen soft [Non Tender] : non-tender [Non-distended] : non-distended [No Masses] : no abdominal mass palpated [No HSM] : no HSM [Normal Bowel Sounds] : normal bowel sounds [No CVA Tenderness] : no CVA  tenderness [No Spinal Tenderness] : no spinal tenderness [No Joint Swelling] : no joint swelling [Grossly Normal Strength/Tone] : grossly normal strength/tone [No Rash] : no rash [Coordination Grossly Intact] : coordination grossly intact [No Focal Deficits] : no focal deficits [Normal Gait] : normal gait [Deep Tendon Reflexes (DTR)] : deep tendon reflexes were 2+ and symmetric [Normal Affect] : the affect was normal [Normal Insight/Judgement] : insight and judgment were intact [de-identified] : Erythematous and edematous nasal mucosa

## 2023-05-01 NOTE — HEALTH RISK ASSESSMENT
[Good] : ~his/her~  mood as  good [Yes] : Yes [2 - 4 times a month (2 pts)] : 2-4 times a month (2 points) [1 or 2 (0 pts)] : 1 or 2 (0 points) [Never (0 pts)] : Never (0 points) [No] : In the past 12 months have you used drugs other than those required for medical reasons? No [0] : 2) Feeling down, depressed, or hopeless: Not at all (0) [PHQ-2 Negative - No further assessment needed] : PHQ-2 Negative - No further assessment needed [Patient reported PAP Smear was normal] : Patient reported PAP Smear was normal [HIV Test offered] : HIV Test offered [Hepatitis C test offered] : Hepatitis C test offered [Behavioral] : behavioral [With Significant Other] : lives with significant other [Employed] : employed [] :  [Sexually Active] : sexually active [Feels Safe at Home] : Feels safe at home [Smoke Detector] : smoke detector [Carbon Monoxide Detector] : carbon monoxide detector [Seat Belt] :  uses seat belt [Sunscreen] : uses sunscreen [Reviewed no changes] : Reviewed, no changes [Name: ___] : Health Care Proxy's Name: [unfilled]  [Relationship: ___] : Relationship: [unfilled] [Never] : Never [Audit-CScore] : 2 [CAD2Xfgvj] : 0 [High Risk Behavior] : no high risk behavior [Reports changes in hearing] : Reports no changes in hearing [Reports changes in vision] : Reports no changes in vision [Reports changes in dental health] : Reports no changes in dental health [TB Exposure] : is not being exposed to tuberculosis [PapSmearDate] : 8/2020 [AdvancecareDate] : 4/28

## 2023-05-03 ENCOUNTER — TRANSCRIPTION ENCOUNTER (OUTPATIENT)
Age: 28
End: 2023-05-03

## 2023-05-03 ENCOUNTER — FORM ENCOUNTER (OUTPATIENT)
Age: 28
End: 2023-05-03

## 2023-05-03 LAB
ALBUMIN SERPL ELPH-MCNC: 4.6 G/DL
ALP BLD-CCNC: 61 U/L
ALT SERPL-CCNC: 11 U/L
ANION GAP SERPL CALC-SCNC: 13 MMOL/L
APPEARANCE: CLEAR
AST SERPL-CCNC: 17 U/L
BACTERIA: ABNORMAL /HPF
BASOPHILS # BLD AUTO: 0.03 K/UL
BASOPHILS NFR BLD AUTO: 0.6 %
BILIRUB SERPL-MCNC: 1.1 MG/DL
BILIRUBIN URINE: NEGATIVE
BLOOD URINE: NEGATIVE
BUN SERPL-MCNC: 8 MG/DL
CALCIUM SERPL-MCNC: 9.5 MG/DL
CAST: 0 /LPF
CHLORIDE SERPL-SCNC: 101 MMOL/L
CHOLEST SERPL-MCNC: 222 MG/DL
CO2 SERPL-SCNC: 24 MMOL/L
COLOR: YELLOW
CREAT SERPL-MCNC: 0.7 MG/DL
EGFR: 120 ML/MIN/1.73M2
EOSINOPHIL # BLD AUTO: 0.05 K/UL
EOSINOPHIL NFR BLD AUTO: 1.1 %
EPITHELIAL CELLS: 9 /HPF
ESTIMATED AVERAGE GLUCOSE: 108 MG/DL
GLUCOSE QUALITATIVE U: NEGATIVE MG/DL
GLUCOSE SERPL-MCNC: 88 MG/DL
HBA1C MFR BLD HPLC: 5.4 %
HBV SURFACE AB SER QL: REACTIVE
HCT VFR BLD CALC: 43.7 %
HDLC SERPL-MCNC: 62 MG/DL
HGB BLD-MCNC: 14.3 G/DL
IMM GRANULOCYTES NFR BLD AUTO: 0.2 %
KETONES URINE: NEGATIVE MG/DL
LDLC SERPL CALC-MCNC: 145 MG/DL
LEUKOCYTE ESTERASE URINE: NEGATIVE
LYMPHOCYTES # BLD AUTO: 1.66 K/UL
LYMPHOCYTES NFR BLD AUTO: 35.9 %
M TB IFN-G BLD-IMP: NEGATIVE
MAN DIFF?: NORMAL
MCHC RBC-ENTMCNC: 30.6 PG
MCHC RBC-ENTMCNC: 32.7 GM/DL
MCV RBC AUTO: 93.4 FL
MEV IGG FLD QL IA: 17.6 AU/ML
MEV IGG+IGM SER-IMP: POSITIVE
MICROSCOPIC-UA: NORMAL
MONOCYTES # BLD AUTO: 0.5 K/UL
MONOCYTES NFR BLD AUTO: 10.8 %
MUV AB SER-ACNC: POSITIVE
MUV IGG SER QL IA: 113 AU/ML
NEUTROPHILS # BLD AUTO: 2.38 K/UL
NEUTROPHILS NFR BLD AUTO: 51.4 %
NITRITE URINE: NEGATIVE
NONHDLC SERPL-MCNC: 160 MG/DL
PH URINE: 6
PLATELET # BLD AUTO: 368 K/UL
POTASSIUM SERPL-SCNC: 4.3 MMOL/L
PROT SERPL-MCNC: 7.2 G/DL
PROTEIN URINE: NEGATIVE MG/DL
QUANTIFERON TB PLUS MITOGEN MINUS NIL: 9.44 IU/ML
QUANTIFERON TB PLUS NIL: 0.03 IU/ML
QUANTIFERON TB PLUS TB1 MINUS NIL: 0 IU/ML
QUANTIFERON TB PLUS TB2 MINUS NIL: 0.02 IU/ML
RBC # BLD: 4.68 M/UL
RBC # FLD: 12.6 %
RED BLOOD CELLS URINE: 1 /HPF
RUBV IGG FLD-ACNC: 3.9 INDEX
RUBV IGG SER-IMP: POSITIVE
SODIUM SERPL-SCNC: 138 MMOL/L
SPECIFIC GRAVITY URINE: 1.02
TRIGL SERPL-MCNC: 78 MG/DL
TSH SERPL-ACNC: 1.47 UIU/ML
UROBILINOGEN URINE: 0.2 MG/DL
VZV AB TITR SER: POSITIVE
VZV IGG SER IF-ACNC: 224.6 INDEX
WBC # FLD AUTO: 4.63 K/UL
WHITE BLOOD CELLS URINE: 0 /HPF

## 2023-05-26 ENCOUNTER — RX CHANGE (OUTPATIENT)
Age: 28
End: 2023-05-26

## 2023-06-07 NOTE — ED ADULT NURSE NOTE - ABDOMEN
<--- Click to Launch ICDx for PreOp, PostOp and Procedure <--- Click to Launch ICDx for PreOp, PostOp and Procedure soft

## 2023-12-14 ENCOUNTER — APPOINTMENT (OUTPATIENT)
Dept: OBGYN | Facility: CLINIC | Age: 28
End: 2023-12-14
Payer: COMMERCIAL

## 2023-12-14 VITALS
BODY MASS INDEX: 27.67 KG/M2 | SYSTOLIC BLOOD PRESSURE: 100 MMHG | HEIGHT: 56 IN | WEIGHT: 123 LBS | DIASTOLIC BLOOD PRESSURE: 60 MMHG

## 2023-12-14 DIAGNOSIS — Z01.419 ENCOUNTER FOR GYNECOLOGICAL EXAMINATION (GENERAL) (ROUTINE) W/OUT ABNORMAL FINDINGS: ICD-10-CM

## 2023-12-14 DIAGNOSIS — N97.9 FEMALE INFERTILITY, UNSPECIFIED: ICD-10-CM

## 2023-12-14 PROCEDURE — 99214 OFFICE O/P EST MOD 30 MIN: CPT | Mod: 25

## 2023-12-14 PROCEDURE — 99395 PREV VISIT EST AGE 18-39: CPT | Mod: 25

## 2023-12-15 PROBLEM — Z01.419 ENCOUNTER FOR ANNUAL ROUTINE GYNECOLOGICAL EXAMINATION: Status: ACTIVE | Noted: 2022-10-07

## 2023-12-18 LAB — CYTOLOGY CVX/VAG DOC THIN PREP: ABNORMAL

## 2023-12-19 ENCOUNTER — TRANSCRIPTION ENCOUNTER (OUTPATIENT)
Age: 28
End: 2023-12-19

## 2024-01-07 LAB
ANTI-MUELLERIAN HORMONE: 3.71 NG/ML
ESTRADIOL SERPL-MCNC: 34 PG/ML
FSH SERPL-MCNC: 10 IU/L
HCG SERPL-MCNC: <1 MIU/ML
PROLACTIN SERPL-MCNC: 17.5 NG/ML

## 2024-01-08 ENCOUNTER — OUTPATIENT (OUTPATIENT)
Dept: OUTPATIENT SERVICES | Facility: HOSPITAL | Age: 29
LOS: 1 days | End: 2024-01-08
Payer: COMMERCIAL

## 2024-01-08 ENCOUNTER — APPOINTMENT (OUTPATIENT)
Dept: ULTRASOUND IMAGING | Facility: HOSPITAL | Age: 29
End: 2024-01-08
Payer: COMMERCIAL

## 2024-01-08 DIAGNOSIS — Z00.00 ENCOUNTER FOR GENERAL ADULT MEDICAL EXAMINATION WITHOUT ABNORMAL FINDINGS: ICD-10-CM

## 2024-01-08 DIAGNOSIS — E03.9 HYPOTHYROIDISM, UNSPECIFIED: ICD-10-CM

## 2024-01-08 DIAGNOSIS — N97.9 FEMALE INFERTILITY, UNSPECIFIED: ICD-10-CM

## 2024-01-08 PROCEDURE — 76830 TRANSVAGINAL US NON-OB: CPT

## 2024-01-08 PROCEDURE — 76830 TRANSVAGINAL US NON-OB: CPT | Mod: 26

## 2024-01-17 LAB — TSH SERPL-ACNC: 3.54 UIU/ML

## 2024-01-25 ENCOUNTER — APPOINTMENT (OUTPATIENT)
Dept: RADIOLOGY | Facility: HOSPITAL | Age: 29
End: 2024-01-25
Payer: COMMERCIAL

## 2024-01-25 ENCOUNTER — OUTPATIENT (OUTPATIENT)
Dept: OUTPATIENT SERVICES | Facility: HOSPITAL | Age: 29
LOS: 1 days | End: 2024-01-25
Payer: COMMERCIAL

## 2024-01-25 DIAGNOSIS — N97.9 FEMALE INFERTILITY, UNSPECIFIED: ICD-10-CM

## 2024-01-25 PROCEDURE — 58340 CATHETER FOR HYSTEROGRAPHY: CPT

## 2024-01-25 PROCEDURE — 74740 X-RAY FEMALE GENITAL TRACT: CPT

## 2024-01-25 PROCEDURE — 74740 X-RAY FEMALE GENITAL TRACT: CPT | Mod: 26

## 2024-02-07 ENCOUNTER — APPOINTMENT (OUTPATIENT)
Dept: GASTROENTEROLOGY | Facility: CLINIC | Age: 29
End: 2024-02-07
Payer: COMMERCIAL

## 2024-02-07 VITALS
HEIGHT: 56 IN | DIASTOLIC BLOOD PRESSURE: 68 MMHG | WEIGHT: 123 LBS | BODY MASS INDEX: 27.67 KG/M2 | SYSTOLIC BLOOD PRESSURE: 108 MMHG

## 2024-02-07 DIAGNOSIS — R14.0 ABDOMINAL DISTENSION (GASEOUS): ICD-10-CM

## 2024-02-07 DIAGNOSIS — Z86.19 PERSONAL HISTORY OF OTHER INFECTIOUS AND PARASITIC DISEASES: ICD-10-CM

## 2024-02-07 DIAGNOSIS — N80.9 ENDOMETRIOSIS, UNSPECIFIED: ICD-10-CM

## 2024-02-07 PROCEDURE — 99214 OFFICE O/P EST MOD 30 MIN: CPT

## 2024-02-07 PROCEDURE — G2211 COMPLEX E/M VISIT ADD ON: CPT

## 2024-02-08 ENCOUNTER — TRANSCRIPTION ENCOUNTER (OUTPATIENT)
Age: 29
End: 2024-02-08

## 2024-02-08 NOTE — ASSESSMENT
[FreeTextEntry1] : Pleasant 28-year-old woman with a Hx of H. Pylori gastritis (treated with Pylera regimen) and a recent endometriosis diagnosis (followed by OBGYN; pt is trying to conceive) presents for follow-up on H. Pylori infection. No endometriosis inside colon on last colonoscopy (reviewed with the pt). Reports continued abdominal bloating and gas. Recommend that the pt continue following a well-balanced diet, around time of menstrual period and ovulation can try following Low-FODMAP diet to reduce abdominal bloating (handout given to pt at today's visit), and complete a repeat H. Pylori breath test.  All questions answered Call with any questions or concerns Time spent before and after visit reviewing patient's chart

## 2024-02-08 NOTE — HISTORY OF PRESENT ILLNESS
[FreeTextEntry1] : Pleasant 28-year-old woman with a Hx of H. Pylori gastritis (treated) and a recent endometriosis diagnosis (followed by OBGYN) presents for follow-up on H. Pylori infection. Pt is trying to conceive. No endometriosis inside colon on last colonoscopy (reviewed with the pt). Reports continued abdominal bloating and gas.

## 2024-02-08 NOTE — CONSULT LETTER
[Dear  ___] : Dear  [unfilled], [Courtesy Letter:] : I had the pleasure of seeing your patient, [unfilled], in my office today. [Please see my note below.] : Please see my note below. [Sincerely,] : Sincerely, [FreeTextEntry3] : Dr. Jennifer Iqbal

## 2024-02-09 ENCOUNTER — TRANSCRIPTION ENCOUNTER (OUTPATIENT)
Age: 29
End: 2024-02-09

## 2024-02-17 ENCOUNTER — NON-APPOINTMENT (OUTPATIENT)
Age: 29
End: 2024-02-17

## 2024-02-21 LAB
T4 FREE SERPL-MCNC: 1.4 NG/DL
TSH SERPL-ACNC: 1.22 UIU/ML

## 2024-02-26 ENCOUNTER — TRANSCRIPTION ENCOUNTER (OUTPATIENT)
Age: 29
End: 2024-02-26

## 2024-02-26 LAB — UREA BREATH TEST QL: NEGATIVE

## 2024-03-06 ENCOUNTER — RX RENEWAL (OUTPATIENT)
Age: 29
End: 2024-03-06

## 2024-03-07 ENCOUNTER — TRANSCRIPTION ENCOUNTER (OUTPATIENT)
Age: 29
End: 2024-03-07

## 2024-03-08 ENCOUNTER — TRANSCRIPTION ENCOUNTER (OUTPATIENT)
Age: 29
End: 2024-03-08

## 2024-03-11 NOTE — ED ADULT NURSE NOTE - CAS DISCH TRANSFER METHOD
Pt is nonverbal, total cares. VSS. No s/s of pain noted, no PRNs given. Pt incontinent of bowel and bladder. Continues to have loose stools, had 3 episodes this shift. Scheduled bowel meds held. Pt is total cares, repositioned and cleaned every 2 hours. Seizure pads on for seizures precautions, and bed alarm on for safety. NOC TF started at 2100 after HS meds, water flushes done per orders. Oral abx started tonight, tolerated well. Plan is to discharge back  tomorrow.    Problem: Gas Exchange Impaired  Goal: Optimal Gas Exchange  Outcome: Progressing  Intervention: Optimize Oxygenation and Ventilation  Recent Flowsheet Documentation  Taken 3/10/2024 2230 by Mary Jo Zamudio RN  Head of Bed (HOB) Positioning: HOB at 20-30 degrees  Taken 3/10/2024 1630 by Mary Jo Zamudio RN  Head of Bed (HOB) Positioning: HOB at 20-30 degrees     Problem: Enteral Nutrition  Goal: Safe, Effective Therapy Delivery  Outcome: Progressing     Problem: Pneumonia  Goal: Resolution of Infection Signs and Symptoms  Outcome: Progressing  Intervention: Prevent Infection Progression  Recent Flowsheet Documentation  Taken 3/10/2024 1630 by Mary Jo Zamudio RN  Isolation Precautions: contact precautions maintained     Problem: Adult Inpatient Plan of Care  Goal: Absence of Hospital-Acquired Illness or Injury  Intervention: Prevent Skin Injury  Recent Flowsheet Documentation  Taken 3/10/2024 2230 by Mary Jo Zamudio RN  Body Position:   turned   right  Taken 3/10/2024 1630 by Mary Jo Zamudio RN  Body Position: left  Skin Protection:   incontinence pads utilized   silicone foam dressing in place   skin to device areas padded   skin to skin areas padded  Intervention: Prevent Infection  Recent Flowsheet Documentation  Taken 3/10/2024 1630 by Mary Jo Zamudio RN  Infection Prevention:   hand hygiene promoted   rest/sleep promoted   single patient room provided     Problem: Comorbidity Management  Goal: Maintenance of Seizure  Control  Intervention: Maintain Seizure Symptom Control  Recent Flowsheet Documentation  Taken 3/10/2024 1630 by Mary Jo Zamudio RN  Sensory Stimulation Regulation:   music on   care clustered   television on   quiet environment promoted     Problem: Enteral Nutrition  Goal: Absence of Aspiration Signs and Symptoms  Intervention: Minimize Aspiration Risk  Recent Flowsheet Documentation  Taken 3/10/2024 2230 by Mary Jo Zamudio RN  Head of Bed (HOB) Positioning: HOB at 20-30 degrees  Taken 3/10/2024 1630 by Mary Jo Zamudio RN  Head of Bed (HOB) Positioning: HOB at 20-30 degrees     Problem: Pneumonia  Goal: Effective Oxygenation and Ventilation  Intervention: Promote Airway Secretion Clearance  Recent Flowsheet Documentation  Taken 3/10/2024 1630 by Mary Jo Zamudio RN  Cough And Deep Breathing: unable to perform  Intervention: Optimize Oxygenation and Ventilation  Recent Flowsheet Documentation  Taken 3/10/2024 2230 by Mary Jo Zamudio RN  Head of Bed (HOB) Positioning: HOB at 20-30 degrees  Taken 3/10/2024 1630 by Mary Jo Zamudio RN  Head of Bed (HOB) Positioning: HOB at 20-30 degrees   Goal Outcome Evaluation:                         Private car

## 2024-03-12 ENCOUNTER — TRANSCRIPTION ENCOUNTER (OUTPATIENT)
Age: 29
End: 2024-03-12

## 2024-03-26 ENCOUNTER — TRANSCRIPTION ENCOUNTER (OUTPATIENT)
Age: 29
End: 2024-03-26

## 2024-03-28 ENCOUNTER — TRANSCRIPTION ENCOUNTER (OUTPATIENT)
Age: 29
End: 2024-03-28

## 2024-03-28 RX ORDER — LEVOTHYROXINE SODIUM 0.03 MG/1
25 TABLET ORAL
Qty: 30 | Refills: 1 | Status: ACTIVE | COMMUNITY
Start: 2024-01-08

## 2024-04-02 ENCOUNTER — TRANSCRIPTION ENCOUNTER (OUTPATIENT)
Age: 29
End: 2024-04-02

## 2024-04-15 ENCOUNTER — APPOINTMENT (OUTPATIENT)
Dept: HUMAN REPRODUCTION | Facility: CLINIC | Age: 29
End: 2024-04-15
Payer: COMMERCIAL

## 2024-04-15 PROCEDURE — 99204 OFFICE O/P NEW MOD 45 MIN: CPT

## 2024-04-15 PROCEDURE — 36415 COLL VENOUS BLD VENIPUNCTURE: CPT

## 2024-04-17 ENCOUNTER — TRANSCRIPTION ENCOUNTER (OUTPATIENT)
Age: 29
End: 2024-04-17

## 2024-04-19 ENCOUNTER — TRANSCRIPTION ENCOUNTER (OUTPATIENT)
Age: 29
End: 2024-04-19

## 2024-05-03 ENCOUNTER — APPOINTMENT (OUTPATIENT)
Dept: OBGYN | Facility: CLINIC | Age: 29
End: 2024-05-03
Payer: COMMERCIAL

## 2024-05-03 VITALS
DIASTOLIC BLOOD PRESSURE: 68 MMHG | SYSTOLIC BLOOD PRESSURE: 122 MMHG | HEIGHT: 56 IN | HEART RATE: 94 BPM | BODY MASS INDEX: 28.12 KG/M2 | WEIGHT: 125 LBS

## 2024-05-03 DIAGNOSIS — N91.2 AMENORRHEA, UNSPECIFIED: ICD-10-CM

## 2024-05-03 PROCEDURE — 76830 TRANSVAGINAL US NON-OB: CPT

## 2024-05-03 PROCEDURE — 99213 OFFICE O/P EST LOW 20 MIN: CPT | Mod: 25

## 2024-05-03 PROCEDURE — 81025 URINE PREGNANCY TEST: CPT

## 2024-05-05 LAB — HCG UR QL: POSITIVE

## 2024-05-05 RX ORDER — BENZONATATE 100 MG/1
100 CAPSULE ORAL 3 TIMES DAILY
Qty: 15 | Refills: 0 | Status: DISCONTINUED | COMMUNITY
Start: 2023-04-28 | End: 2024-05-05

## 2024-05-05 RX ORDER — AZITHROMYCIN 250 MG/1
250 TABLET, FILM COATED ORAL
Qty: 1 | Refills: 0 | Status: DISCONTINUED | COMMUNITY
Start: 2023-04-28 | End: 2024-05-05

## 2024-05-05 RX ORDER — FLUTICASONE PROPIONATE 50 UG/1
50 SPRAY, METERED NASAL TWICE DAILY
Qty: 1 | Refills: 1 | Status: DISCONTINUED | COMMUNITY
Start: 2023-04-28 | End: 2024-05-05

## 2024-05-05 RX ORDER — ALUMINUM CHLORIDE 20 %
20 SOLUTION, NON-ORAL TOPICAL
Qty: 1 | Refills: 5 | Status: DISCONTINUED | COMMUNITY
Start: 2022-04-05 | End: 2024-05-05

## 2024-05-05 NOTE — PROCEDURE
[Amenorrhea] : Amenorrhea [FreeTextEntry3] : transvaginal ultrasound performed by me at beside revealed a viable SIUP with CRL 10.09 mm c/w 7 weeks 1 day.  normal adnexa bilaterally, no free fluid.  [Transvaginal Ultrasound] : transvaginal ultrasound

## 2024-05-05 NOTE — PLAN
[FreeTextEntry1] : 30 yo  at 7 weeks 0/7 days based on LMP and confirmed by me on bedside sonogram.   - oriented to HH and call coverage, reviewed Dr Batres vs my care, pt to decide who she will follow up with. - Rx ob bloodwork ordered, had BW done with Dr Culp, I do not see results available in TW.  - reviewed NIPT/nuchal/panorama, to be done next visit - bleeding precautions, counseling done.  - on synthroid 25 mcg, continue, will check TFTS with OB BW

## 2024-05-05 NOTE — HISTORY OF PRESENT ILLNESS
[FreeTextEntry1] : pt is a 30 yo  at 7 weeks 0 days (based on LMP) here for confirmation of pregnancy here with FOB and sister pt denies VB, feeling a little queasy first thing in am, but otherwise feels well. was seen by Dr Culp- this is a spontaneous pregnancy LMP 3/15  [PapSmeardate] : 12/2023

## 2024-05-05 NOTE — REASON FOR VISIT
[Acute] : an acute visit for [Amenorrhea] : amenorrhea [Spouse] : spouse [Family Member] : family member

## 2024-05-13 ENCOUNTER — TRANSCRIPTION ENCOUNTER (OUTPATIENT)
Age: 29
End: 2024-05-13

## 2024-05-14 ENCOUNTER — TRANSCRIPTION ENCOUNTER (OUTPATIENT)
Age: 29
End: 2024-05-14

## 2024-05-20 LAB
ABO + RH PNL BLD: NORMAL
ALBUMIN SERPL ELPH-MCNC: 4.3 G/DL
ALP BLD-CCNC: 54 U/L
ALT SERPL-CCNC: 9 U/L
ANION GAP SERPL CALC-SCNC: 12 MMOL/L
AST SERPL-CCNC: 14 U/L
BACTERIA UR CULT: NORMAL
BASOPHILS # BLD AUTO: 0.04 K/UL
BASOPHILS NFR BLD AUTO: 0.4 %
BILIRUB SERPL-MCNC: 0.6 MG/DL
BLD GP AB SCN SERPL QL: NORMAL
BUN SERPL-MCNC: 8 MG/DL
CALCIUM SERPL-MCNC: 9.9 MG/DL
CHLORIDE SERPL-SCNC: 99 MMOL/L
CO2 SERPL-SCNC: 22 MMOL/L
CREAT SERPL-MCNC: 0.65 MG/DL
EGFR: 121 ML/MIN/1.73M2
EOSINOPHIL # BLD AUTO: 0.12 K/UL
EOSINOPHIL NFR BLD AUTO: 1.3 %
ESTIMATED AVERAGE GLUCOSE: 111 MG/DL
GLUCOSE SERPL-MCNC: 84 MG/DL
HBA1C MFR BLD HPLC: 5.5 %
HBV SURFACE AG SER QL: NONREACTIVE
HCT VFR BLD CALC: 40.3 %
HCV AB SER QL: NONREACTIVE
HCV S/CO RATIO: 0.08 S/CO
HGB A MFR BLD: 96.8 %
HGB A2 MFR BLD: 2.5 %
HGB BLD-MCNC: 13.1 G/DL
HGB F MFR BLD: 0.7 %
HGB FRACT BLD-IMP: NORMAL
HIV1+2 AB SPEC QL IA.RAPID: NONREACTIVE
IMM GRANULOCYTES NFR BLD AUTO: 0.2 %
LEAD BLD-MCNC: <1 UG/DL
LYMPHOCYTES # BLD AUTO: 1.81 K/UL
LYMPHOCYTES NFR BLD AUTO: 20.3 %
MAN DIFF?: NORMAL
MCHC RBC-ENTMCNC: 29.8 PG
MCHC RBC-ENTMCNC: 32.5 GM/DL
MCV RBC AUTO: 91.6 FL
MEV IGG FLD QL IA: 9.1 AU/ML
MEV IGG+IGM SER-IMP: NEGATIVE
MONOCYTES # BLD AUTO: 0.82 K/UL
MONOCYTES NFR BLD AUTO: 9.2 %
MUV AB SER-ACNC: POSITIVE
MUV IGG SER QL IA: 104 AU/ML
NEUTROPHILS # BLD AUTO: 6.11 K/UL
NEUTROPHILS NFR BLD AUTO: 68.6 %
PLATELET # BLD AUTO: 363 K/UL
POTASSIUM SERPL-SCNC: 4 MMOL/L
PROT SERPL-MCNC: 6.9 G/DL
RBC # BLD: 4.4 M/UL
RBC # FLD: 13.5 %
RUBV IGG FLD-ACNC: 2.4 INDEX
RUBV IGG SER-IMP: POSITIVE
SODIUM SERPL-SCNC: 133 MMOL/L
T GONDII AB SER-IMP: NEGATIVE
T GONDII AB SER-IMP: NEGATIVE
T GONDII IGG SER QL: <3 IU/ML
T GONDII IGM SER QL: <3 AU/ML
T PALLIDUM AB SER QL IA: NEGATIVE
T3FREE SERPL-MCNC: 3.07 PG/ML
TSH SERPL-ACNC: 1.16 UIU/ML
TSH SERPL-ACNC: 1.18 UIU/ML
VZV AB TITR SER: POSITIVE
VZV IGG SER IF-ACNC: 252.1 INDEX
WBC # FLD AUTO: 8.92 K/UL

## 2024-05-21 LAB
B19V IGG SER QL IA: 0.21 INDEX
B19V IGG+IGM SER-IMP: NEGATIVE
B19V IGG+IGM SER-IMP: NORMAL
B19V IGM FLD-ACNC: 0.11 INDEX
B19V IGM SER-ACNC: NEGATIVE

## 2024-05-22 LAB — AR GENE MUT ANL BLD/T: NORMAL

## 2024-05-23 LAB — FMR1 GENE MUT ANL BLD/T: NORMAL

## 2024-05-27 ENCOUNTER — NON-APPOINTMENT (OUTPATIENT)
Age: 29
End: 2024-05-27

## 2024-05-28 LAB — CFTR MUT TESTED BLD/T: NEGATIVE

## 2024-05-29 ENCOUNTER — NON-APPOINTMENT (OUTPATIENT)
Age: 29
End: 2024-05-29

## 2024-05-29 ENCOUNTER — APPOINTMENT (OUTPATIENT)
Dept: ANTEPARTUM | Facility: CLINIC | Age: 29
End: 2024-05-29
Payer: COMMERCIAL

## 2024-05-29 ENCOUNTER — ASOB RESULT (OUTPATIENT)
Age: 29
End: 2024-05-29

## 2024-05-29 ENCOUNTER — APPOINTMENT (OUTPATIENT)
Dept: OBGYN | Facility: CLINIC | Age: 29
End: 2024-05-29
Payer: COMMERCIAL

## 2024-05-29 VITALS
BODY MASS INDEX: 28.57 KG/M2 | HEIGHT: 56 IN | HEART RATE: 92 BPM | WEIGHT: 127 LBS | SYSTOLIC BLOOD PRESSURE: 117 MMHG | OXYGEN SATURATION: 99 % | DIASTOLIC BLOOD PRESSURE: 77 MMHG

## 2024-05-29 LAB
BILIRUB UR QL STRIP: NEGATIVE
CLARITY UR: CLEAR
COLLECTION METHOD: NORMAL
GLUCOSE UR-MCNC: NEGATIVE
HCG UR QL: 0.2 EU/DL
HGB UR QL STRIP.AUTO: NORMAL
KETONES UR-MCNC: NEGATIVE
LEUKOCYTE ESTERASE UR QL STRIP: NEGATIVE
NITRITE UR QL STRIP: NEGATIVE
PH UR STRIP: 6
PROT UR STRIP-MCNC: NEGATIVE
SP GR UR STRIP: 1.02

## 2024-05-29 PROCEDURE — 0501F PRENATAL FLOW SHEET: CPT

## 2024-05-29 PROCEDURE — 76801 OB US < 14 WKS SINGLE FETUS: CPT

## 2024-05-29 PROCEDURE — 81003 URINALYSIS AUTO W/O SCOPE: CPT | Mod: NC,QW

## 2024-06-12 RX ORDER — LEVOTHYROXINE SODIUM 0.03 MG/1
25 TABLET ORAL DAILY
Qty: 60 | Refills: 3 | Status: ACTIVE | COMMUNITY
Start: 2024-06-12 | End: 1900-01-01

## 2024-06-16 ENCOUNTER — NON-APPOINTMENT (OUTPATIENT)
Age: 29
End: 2024-06-16

## 2024-06-17 ENCOUNTER — APPOINTMENT (OUTPATIENT)
Dept: ANTEPARTUM | Facility: CLINIC | Age: 29
End: 2024-06-17
Payer: COMMERCIAL

## 2024-06-17 ENCOUNTER — APPOINTMENT (OUTPATIENT)
Dept: OBGYN | Facility: CLINIC | Age: 29
End: 2024-06-17
Payer: COMMERCIAL

## 2024-06-17 ENCOUNTER — NON-APPOINTMENT (OUTPATIENT)
Age: 29
End: 2024-06-17

## 2024-06-17 ENCOUNTER — ASOB RESULT (OUTPATIENT)
Age: 29
End: 2024-06-17

## 2024-06-17 VITALS
HEART RATE: 89 BPM | DIASTOLIC BLOOD PRESSURE: 69 MMHG | BODY MASS INDEX: 29.2 KG/M2 | OXYGEN SATURATION: 98 % | SYSTOLIC BLOOD PRESSURE: 105 MMHG | WEIGHT: 129.8 LBS | HEIGHT: 56 IN

## 2024-06-17 DIAGNOSIS — Z34.00 ENCOUNTER FOR SUPERVISION OF NORMAL FIRST PREGNANCY, UNSPECIFIED TRIMESTER: ICD-10-CM

## 2024-06-17 LAB
BILIRUB UR QL STRIP: NORMAL
CLARITY UR: CLEAR
COLLECTION METHOD: NORMAL
GLUCOSE UR-MCNC: NORMAL
HCG UR QL: 0.2 EU/DL
HGB UR QL STRIP.AUTO: NORMAL
KETONES UR-MCNC: NORMAL
LEUKOCYTE ESTERASE UR QL STRIP: NORMAL
NITRITE UR QL STRIP: NORMAL
PH UR STRIP: 7
PROT UR STRIP-MCNC: NORMAL
SP GR UR STRIP: 1.02

## 2024-06-17 PROCEDURE — 81003 URINALYSIS AUTO W/O SCOPE: CPT | Mod: NC,QW

## 2024-06-17 PROCEDURE — 0502F SUBSEQUENT PRENATAL CARE: CPT

## 2024-06-17 PROCEDURE — 36415 COLL VENOUS BLD VENIPUNCTURE: CPT

## 2024-06-17 PROCEDURE — 76813 OB US NUCHAL MEAS 1 GEST: CPT

## 2024-06-20 ENCOUNTER — NON-APPOINTMENT (OUTPATIENT)
Age: 29
End: 2024-06-20

## 2024-06-20 LAB
ADDITIONAL US: NORMAL
COMMENTS: AFP: NORMAL
CRL SCAN TWIN B: NORMAL
CRL SCAN: NORMAL
CROWN RUMP LENGTH TWIN B: NORMAL
CROWN RUMP LENGTH: 75.6 MM
DOWN SYNDROME AGE RISK: NORMAL
DOWN SYNDROME INTERPRETATION: NORMAL
DOWN SYNDROME SCREENING RISK: NORMAL
GEST. AGE ON COLLECTION DATE: 13.4 WEEKS
HCG MOM: 0.83
HCG VALUE: 73 IU/ML
MATERNAL AGE AT EDD: 30.7 YR
NOTE: AFP: NORMAL
NT MOM TWIN B: NORMAL
NT TWIN B: NORMAL
NUCHAL TRANSLUCENCY (NT): 2.1 MM
NUCHAL TRANSLUCENCY MOM: 1.04
NUMBER OF FETUSES: 1
PAPP-A MOM: 0.82
PAPP-A VALUE: 1385.2 NG/ML
RACE: NORMAL
RESULTS AFP: NORMAL
SONOGRAPHER ID#: NORMAL
SUBMIT PART 2 SAMPLE USING: NORMAL
TEST RESULTS: AFP: NORMAL
TRISOMY 18 AGE RISK: NORMAL
TRISOMY 18 INTERPRETATION: NORMAL
TRISOMY 18 SCREENING RISK: NORMAL
WEIGHT AFP: 129 LBS

## 2024-06-26 ENCOUNTER — APPOINTMENT (OUTPATIENT)
Dept: OBGYN | Facility: CLINIC | Age: 29
End: 2024-06-26

## 2024-07-19 ENCOUNTER — APPOINTMENT (OUTPATIENT)
Dept: FAMILY MEDICINE | Facility: CLINIC | Age: 29
End: 2024-07-19
Payer: COMMERCIAL

## 2024-07-19 ENCOUNTER — APPOINTMENT (OUTPATIENT)
Dept: OBGYN | Facility: CLINIC | Age: 29
End: 2024-07-19
Payer: COMMERCIAL

## 2024-07-19 VITALS
SYSTOLIC BLOOD PRESSURE: 100 MMHG | BODY MASS INDEX: 29.92 KG/M2 | HEIGHT: 56 IN | DIASTOLIC BLOOD PRESSURE: 67 MMHG | HEART RATE: 89 BPM | TEMPERATURE: 97.9 F | RESPIRATION RATE: 15 BRPM | WEIGHT: 133 LBS | OXYGEN SATURATION: 98 %

## 2024-07-19 VITALS — DIASTOLIC BLOOD PRESSURE: 74 MMHG | BODY MASS INDEX: 29.37 KG/M2 | SYSTOLIC BLOOD PRESSURE: 121 MMHG | WEIGHT: 131 LBS

## 2024-07-19 DIAGNOSIS — R61 GENERALIZED HYPERHIDROSIS: ICD-10-CM

## 2024-07-19 DIAGNOSIS — Z13.31 ENCOUNTER FOR SCREENING FOR DEPRESSION: ICD-10-CM

## 2024-07-19 DIAGNOSIS — R94.6 ABNORMAL RESULTS OF THYROID FUNCTION STUDIES: ICD-10-CM

## 2024-07-19 DIAGNOSIS — Z00.00 ENCOUNTER FOR GENERAL ADULT MEDICAL EXAMINATION W/OUT ABNORMAL FINDINGS: ICD-10-CM

## 2024-07-19 DIAGNOSIS — E03.9 HYPOTHYROIDISM, UNSPECIFIED: ICD-10-CM

## 2024-07-19 PROCEDURE — 36415 COLL VENOUS BLD VENIPUNCTURE: CPT

## 2024-07-19 PROCEDURE — 96127 BRIEF EMOTIONAL/BEHAV ASSMT: CPT

## 2024-07-19 PROCEDURE — 99213 OFFICE O/P EST LOW 20 MIN: CPT | Mod: 25

## 2024-07-19 PROCEDURE — 0502F SUBSEQUENT PRENATAL CARE: CPT

## 2024-07-19 PROCEDURE — 99395 PREV VISIT EST AGE 18-39: CPT

## 2024-07-23 LAB
ADDITIONAL US: NORMAL
AFP MOM: 1.21
AFP VALUE: 60.3 NG/ML
COLLECTED ON 2: NORMAL
COLLECTED ON: NORMAL
CRL SCAN TWIN B: NORMAL
CRL SCAN: NORMAL
CROWN RUMP LENGTH TWIN B: NORMAL
CROWN RUMP LENGTH: 75.6 MM
DIA MOM: 1.57
DIA VALUE: 264.1 PG/ML
DOWN SYNDROME AGE RISK: NORMAL
DOWN SYNDROME INTERPRETATION: NORMAL
DOWN SYNDROME SCREENING RISK: NORMAL
FIRST TRIMESTER SAMPLE: NORMAL
GEST. AGE ON COLLECTION DATE: 13.4 WEEKS
GESTATIONAL AGE: 18 WEEKS
HCG MOM: 0.54
HCG VALUE: 13.9 IU/ML
INSULIN DEP DIABETES: NO
MATERNAL AGE AT EDD: 30.7 YR
NT MOM TWIN B: NORMAL
NT TWIN B: NORMAL
NUCHAL TRANSLUCENCY (NT): 2.1 MM
NUCHAL TRANSLUCENCY MOM: 1.04
NUMBER OF FETUSES: 1
OPEN SPINA BIFIDA: NORMAL
OSB INTERPRETATION: NORMAL
PAPP-A MOM: 0.82
PAPP-A VALUE: 1385.2 NG/ML
RACE: NORMAL
SECOND TRIMESTER SAMPLE: NORMAL
SEQUENTIAL 2 COMMENTS: NORMAL
SEQUENTIAL 2 NOTE: NORMAL
SEQUENTIAL 2 RESULTS: NORMAL
SEQUENTIAL 2 TEST RESULTS: NORMAL
SONOGRAPHER ID#: NORMAL
TRISOMY 18 AGE RISK: NORMAL
TRISOMY 18 INTERPRETATION: NORMAL
TRISOMY 18 SCREENING RISK: NORMAL
UE3 MOM: 1.15
UE3 VALUE: 1.74 NG/ML
WEIGHT AFP: 129 LBS
WEIGHT: 131 LBS

## 2024-07-25 ENCOUNTER — TRANSCRIPTION ENCOUNTER (OUTPATIENT)
Age: 29
End: 2024-07-25

## 2024-07-25 LAB
T4 FREE SERPL-MCNC: 1 NG/DL
TSH SERPL-ACNC: 1.55 UIU/ML

## 2024-08-06 ENCOUNTER — NON-APPOINTMENT (OUTPATIENT)
Age: 29
End: 2024-08-06

## 2024-08-09 ENCOUNTER — ASOB RESULT (OUTPATIENT)
Age: 29
End: 2024-08-09

## 2024-08-09 ENCOUNTER — NON-APPOINTMENT (OUTPATIENT)
Age: 29
End: 2024-08-09

## 2024-08-09 ENCOUNTER — APPOINTMENT (OUTPATIENT)
Dept: OBGYN | Facility: CLINIC | Age: 29
End: 2024-08-09

## 2024-08-09 ENCOUNTER — APPOINTMENT (OUTPATIENT)
Dept: ANTEPARTUM | Facility: CLINIC | Age: 29
End: 2024-08-09

## 2024-08-09 PROCEDURE — 0502F SUBSEQUENT PRENATAL CARE: CPT

## 2024-08-09 PROCEDURE — 76817 TRANSVAGINAL US OBSTETRIC: CPT

## 2024-08-09 PROCEDURE — 76811 OB US DETAILED SNGL FETUS: CPT

## 2024-08-09 PROCEDURE — 81003 URINALYSIS AUTO W/O SCOPE: CPT | Mod: NC,QW

## 2024-08-12 ENCOUNTER — APPOINTMENT (OUTPATIENT)
Dept: OBGYN | Facility: CLINIC | Age: 29
End: 2024-08-12

## 2024-08-12 PROCEDURE — 36415 COLL VENOUS BLD VENIPUNCTURE: CPT

## 2024-08-12 PROCEDURE — 0502F SUBSEQUENT PRENATAL CARE: CPT

## 2024-08-12 PROCEDURE — 81003 URINALYSIS AUTO W/O SCOPE: CPT | Mod: NC,QW

## 2024-08-13 LAB — GLUCOSE 1H P 100 G GLC PO SERPL-MCNC: 177 MG/DL

## 2024-09-04 ENCOUNTER — TRANSCRIPTION ENCOUNTER (OUTPATIENT)
Age: 29
End: 2024-09-04

## 2024-09-04 ENCOUNTER — APPOINTMENT (OUTPATIENT)
Dept: OBGYN | Facility: CLINIC | Age: 29
End: 2024-09-04
Payer: COMMERCIAL

## 2024-09-04 VITALS
SYSTOLIC BLOOD PRESSURE: 105 MMHG | BODY MASS INDEX: 30.49 KG/M2 | WEIGHT: 136 LBS | OXYGEN SATURATION: 98 % | HEART RATE: 98 BPM | DIASTOLIC BLOOD PRESSURE: 69 MMHG

## 2024-09-04 LAB
BILIRUB UR QL STRIP: NORMAL
CLARITY UR: CLEAR
COLLECTION METHOD: NORMAL
GLUCOSE UR-MCNC: NORMAL
HCG UR QL: 0.2 EU/DL
HGB UR QL STRIP.AUTO: NORMAL
KETONES UR-MCNC: NORMAL
LEUKOCYTE ESTERASE UR QL STRIP: NORMAL
NITRITE UR QL STRIP: NORMAL
PH UR STRIP: 6
PROT UR STRIP-MCNC: NORMAL
SP GR UR STRIP: 1.02

## 2024-09-04 PROCEDURE — 0502F SUBSEQUENT PRENATAL CARE: CPT

## 2024-09-04 PROCEDURE — 81003 URINALYSIS AUTO W/O SCOPE: CPT | Mod: NC,QW

## 2024-09-06 ENCOUNTER — APPOINTMENT (OUTPATIENT)
Dept: OBGYN | Facility: CLINIC | Age: 29
End: 2024-09-06

## 2024-09-09 LAB
M TB IFN-G BLD-IMP: NEGATIVE
QUANTIFERON TB PLUS MITOGEN MINUS NIL: >10 IU/ML
QUANTIFERON TB PLUS NIL: 0.03 IU/ML
QUANTIFERON TB PLUS TB1 MINUS NIL: 0 IU/ML
QUANTIFERON TB PLUS TB2 MINUS NIL: 0 IU/ML

## 2024-09-10 ENCOUNTER — TRANSCRIPTION ENCOUNTER (OUTPATIENT)
Age: 29
End: 2024-09-10

## 2024-09-25 ENCOUNTER — TRANSCRIPTION ENCOUNTER (OUTPATIENT)
Age: 29
End: 2024-09-25

## 2024-09-26 ENCOUNTER — TRANSCRIPTION ENCOUNTER (OUTPATIENT)
Age: 29
End: 2024-09-26

## 2024-09-27 ENCOUNTER — NON-APPOINTMENT (OUTPATIENT)
Age: 29
End: 2024-09-27

## 2024-09-30 ENCOUNTER — APPOINTMENT (OUTPATIENT)
Dept: OBGYN | Facility: CLINIC | Age: 29
End: 2024-09-30
Payer: COMMERCIAL

## 2024-09-30 VITALS
DIASTOLIC BLOOD PRESSURE: 60 MMHG | SYSTOLIC BLOOD PRESSURE: 108 MMHG | WEIGHT: 143 LBS | BODY MASS INDEX: 32.17 KG/M2 | HEIGHT: 56 IN

## 2024-09-30 PROCEDURE — 81003 URINALYSIS AUTO W/O SCOPE: CPT | Mod: NC,QW

## 2024-09-30 PROCEDURE — 90656 IIV3 VACC NO PRSV 0.5 ML IM: CPT

## 2024-09-30 PROCEDURE — 90471 IMMUNIZATION ADMIN: CPT

## 2024-09-30 PROCEDURE — 0502F SUBSEQUENT PRENATAL CARE: CPT

## 2024-10-07 ENCOUNTER — NON-APPOINTMENT (OUTPATIENT)
Age: 29
End: 2024-10-07

## 2024-10-08 ENCOUNTER — TRANSCRIPTION ENCOUNTER (OUTPATIENT)
Age: 29
End: 2024-10-08

## 2024-10-09 ENCOUNTER — NON-APPOINTMENT (OUTPATIENT)
Age: 29
End: 2024-10-09

## 2024-10-09 ENCOUNTER — TRANSCRIPTION ENCOUNTER (OUTPATIENT)
Age: 29
End: 2024-10-09

## 2024-10-14 ENCOUNTER — APPOINTMENT (OUTPATIENT)
Dept: ANTEPARTUM | Facility: CLINIC | Age: 29
End: 2024-10-14
Payer: COMMERCIAL

## 2024-10-14 ENCOUNTER — APPOINTMENT (OUTPATIENT)
Dept: OBGYN | Facility: CLINIC | Age: 29
End: 2024-10-14
Payer: COMMERCIAL

## 2024-10-14 ENCOUNTER — ASOB RESULT (OUTPATIENT)
Age: 29
End: 2024-10-14

## 2024-10-14 VITALS
DIASTOLIC BLOOD PRESSURE: 60 MMHG | SYSTOLIC BLOOD PRESSURE: 110 MMHG | BODY MASS INDEX: 32.39 KG/M2 | WEIGHT: 144 LBS | HEIGHT: 56 IN

## 2024-10-14 PROCEDURE — 81003 URINALYSIS AUTO W/O SCOPE: CPT | Mod: NC,QW

## 2024-10-14 PROCEDURE — 90715 TDAP VACCINE 7 YRS/> IM: CPT

## 2024-10-14 PROCEDURE — 0502F SUBSEQUENT PRENATAL CARE: CPT

## 2024-10-14 PROCEDURE — 36415 COLL VENOUS BLD VENIPUNCTURE: CPT

## 2024-10-14 PROCEDURE — 76816 OB US FOLLOW-UP PER FETUS: CPT

## 2024-10-14 PROCEDURE — 90471 IMMUNIZATION ADMIN: CPT

## 2024-10-15 LAB
BASOPHILS # BLD AUTO: 0.02 K/UL
BASOPHILS NFR BLD AUTO: 0.3 %
EOSINOPHIL # BLD AUTO: 0.06 K/UL
EOSINOPHIL NFR BLD AUTO: 0.9 %
HCT VFR BLD CALC: 38.8 %
HGB BLD-MCNC: 12.7 G/DL
HIV1+2 AB SPEC QL IA.RAPID: NONREACTIVE
IMM GRANULOCYTES NFR BLD AUTO: 0.7 %
LYMPHOCYTES # BLD AUTO: 1.54 K/UL
LYMPHOCYTES NFR BLD AUTO: 22.6 %
MAN DIFF?: NORMAL
MCHC RBC-ENTMCNC: 29.7 PG
MCHC RBC-ENTMCNC: 32.7 GM/DL
MCV RBC AUTO: 90.7 FL
MONOCYTES # BLD AUTO: 0.44 K/UL
MONOCYTES NFR BLD AUTO: 6.5 %
NEUTROPHILS # BLD AUTO: 4.71 K/UL
NEUTROPHILS NFR BLD AUTO: 69 %
PLATELET # BLD AUTO: 180 K/UL
RBC # BLD: 4.28 M/UL
RBC # FLD: 13.6 %
T PALLIDUM AB SER QL IA: NEGATIVE
WBC # FLD AUTO: 6.82 K/UL

## 2024-10-16 LAB — BACTERIA UR CULT: NORMAL

## 2024-10-29 ENCOUNTER — NON-APPOINTMENT (OUTPATIENT)
Age: 29
End: 2024-10-29

## 2024-10-30 ENCOUNTER — APPOINTMENT (OUTPATIENT)
Dept: OBGYN | Facility: CLINIC | Age: 29
End: 2024-10-30
Payer: COMMERCIAL

## 2024-10-30 VITALS
BODY MASS INDEX: 33.85 KG/M2 | HEART RATE: 96 BPM | OXYGEN SATURATION: 96 % | WEIGHT: 151 LBS | SYSTOLIC BLOOD PRESSURE: 126 MMHG | DIASTOLIC BLOOD PRESSURE: 77 MMHG

## 2024-10-30 PROCEDURE — 0502F SUBSEQUENT PRENATAL CARE: CPT

## 2024-10-30 PROCEDURE — 81003 URINALYSIS AUTO W/O SCOPE: CPT | Mod: NC,QW

## 2024-10-31 ENCOUNTER — NON-APPOINTMENT (OUTPATIENT)
Age: 29
End: 2024-10-31

## 2024-11-07 ENCOUNTER — EMERGENCY (EMERGENCY)
Facility: HOSPITAL | Age: 29
LOS: 0 days | Discharge: HOME | End: 2024-11-07
Attending: STUDENT IN AN ORGANIZED HEALTH CARE EDUCATION/TRAINING PROGRAM
Payer: COMMERCIAL

## 2024-11-07 ENCOUNTER — OUTPATIENT (OUTPATIENT)
Dept: INPATIENT UNIT | Facility: HOSPITAL | Age: 29
LOS: 1 days | Discharge: HOME | End: 2024-11-07
Payer: COMMERCIAL

## 2024-11-07 VITALS
OXYGEN SATURATION: 99 % | HEART RATE: 67 BPM | SYSTOLIC BLOOD PRESSURE: 132 MMHG | TEMPERATURE: 98 F | DIASTOLIC BLOOD PRESSURE: 74 MMHG | RESPIRATION RATE: 18 BRPM

## 2024-11-07 VITALS
TEMPERATURE: 98 F | RESPIRATION RATE: 18 BRPM | SYSTOLIC BLOOD PRESSURE: 120 MMHG | DIASTOLIC BLOOD PRESSURE: 78 MMHG | OXYGEN SATURATION: 100 % | WEIGHT: 154.76 LBS | HEART RATE: 86 BPM

## 2024-11-07 VITALS
OXYGEN SATURATION: 98 % | TEMPERATURE: 98 F | HEART RATE: 63 BPM | SYSTOLIC BLOOD PRESSURE: 109 MMHG | RESPIRATION RATE: 18 BRPM | DIASTOLIC BLOOD PRESSURE: 77 MMHG

## 2024-11-07 DIAGNOSIS — M54.50 LOW BACK PAIN, UNSPECIFIED: ICD-10-CM

## 2024-11-07 DIAGNOSIS — M54.89 OTHER DORSALGIA: ICD-10-CM

## 2024-11-07 DIAGNOSIS — O99.891 OTHER SPECIFIED DISEASES AND CONDITIONS COMPLICATING PREGNANCY: ICD-10-CM

## 2024-11-07 DIAGNOSIS — Z98.890 OTHER SPECIFIED POSTPROCEDURAL STATES: Chronic | ICD-10-CM

## 2024-11-07 DIAGNOSIS — O26.899 OTHER SPECIFIED PREGNANCY RELATED CONDITIONS, UNSPECIFIED TRIMESTER: ICD-10-CM

## 2024-11-07 DIAGNOSIS — Z3A.34 34 WEEKS GESTATION OF PREGNANCY: ICD-10-CM

## 2024-11-07 LAB
ALBUMIN SERPL ELPH-MCNC: 2.3 G/DL — LOW (ref 3.3–5)
ALP SERPL-CCNC: 234 U/L — HIGH (ref 40–120)
ALT FLD-CCNC: 55 U/L — SIGNIFICANT CHANGE UP (ref 12–78)
ANION GAP SERPL CALC-SCNC: 5 MMOL/L — SIGNIFICANT CHANGE UP (ref 5–17)
APPEARANCE UR: CLEAR — SIGNIFICANT CHANGE UP
AST SERPL-CCNC: 50 U/L — HIGH (ref 15–37)
BACTERIA # UR AUTO: ABNORMAL /HPF
BASOPHILS # BLD AUTO: 0.01 K/UL — SIGNIFICANT CHANGE UP (ref 0–0.2)
BASOPHILS NFR BLD AUTO: 0.1 % — SIGNIFICANT CHANGE UP (ref 0–2)
BILIRUB SERPL-MCNC: 0.4 MG/DL — SIGNIFICANT CHANGE UP (ref 0.2–1.2)
BILIRUB UR-MCNC: NEGATIVE — SIGNIFICANT CHANGE UP
BUN SERPL-MCNC: 12 MG/DL — SIGNIFICANT CHANGE UP (ref 7–23)
CALCIUM SERPL-MCNC: 9.3 MG/DL — SIGNIFICANT CHANGE UP (ref 8.5–10.1)
CAST: 1 /LPF — SIGNIFICANT CHANGE UP (ref 0–4)
CHLORIDE SERPL-SCNC: 108 MMOL/L — SIGNIFICANT CHANGE UP (ref 96–108)
CO2 SERPL-SCNC: 27 MMOL/L — SIGNIFICANT CHANGE UP (ref 22–31)
COLOR SPEC: YELLOW — SIGNIFICANT CHANGE UP
CREAT ?TM UR-MCNC: 159 MG/DL — SIGNIFICANT CHANGE UP
CREAT SERPL-MCNC: 0.75 MG/DL — SIGNIFICANT CHANGE UP (ref 0.5–1.3)
DIFF PNL FLD: NEGATIVE — SIGNIFICANT CHANGE UP
EGFR: 110 ML/MIN/1.73M2 — SIGNIFICANT CHANGE UP
EOSINOPHIL # BLD AUTO: 0.09 K/UL — SIGNIFICANT CHANGE UP (ref 0–0.5)
EOSINOPHIL NFR BLD AUTO: 1.3 % — SIGNIFICANT CHANGE UP (ref 0–6)
GLUCOSE SERPL-MCNC: 83 MG/DL — SIGNIFICANT CHANGE UP (ref 70–99)
GLUCOSE UR QL: NEGATIVE MG/DL — SIGNIFICANT CHANGE UP
HCT VFR BLD CALC: 36.7 % — SIGNIFICANT CHANGE UP (ref 34.5–45)
HGB BLD-MCNC: 12.1 G/DL — SIGNIFICANT CHANGE UP (ref 11.5–15.5)
IMM GRANULOCYTES NFR BLD AUTO: 0.7 % — SIGNIFICANT CHANGE UP (ref 0–0.9)
KETONES UR-MCNC: NEGATIVE MG/DL — SIGNIFICANT CHANGE UP
LEUKOCYTE ESTERASE UR-ACNC: ABNORMAL
LIDOCAIN IGE QN: 33 U/L — SIGNIFICANT CHANGE UP (ref 13–75)
LYMPHOCYTES # BLD AUTO: 1.38 K/UL — SIGNIFICANT CHANGE UP (ref 1–3.3)
LYMPHOCYTES # BLD AUTO: 20.3 % — SIGNIFICANT CHANGE UP (ref 13–44)
MCHC RBC-ENTMCNC: 30 PG — SIGNIFICANT CHANGE UP (ref 27–34)
MCHC RBC-ENTMCNC: 33 G/DL — SIGNIFICANT CHANGE UP (ref 32–36)
MCV RBC AUTO: 90.8 FL — SIGNIFICANT CHANGE UP (ref 80–100)
MONOCYTES # BLD AUTO: 0.7 K/UL — SIGNIFICANT CHANGE UP (ref 0–0.9)
MONOCYTES NFR BLD AUTO: 10.3 % — SIGNIFICANT CHANGE UP (ref 2–14)
NEUTROPHILS # BLD AUTO: 4.56 K/UL — SIGNIFICANT CHANGE UP (ref 1.8–7.4)
NEUTROPHILS NFR BLD AUTO: 67.3 % — SIGNIFICANT CHANGE UP (ref 43–77)
NITRITE UR-MCNC: NEGATIVE — SIGNIFICANT CHANGE UP
PH UR: 6 — SIGNIFICANT CHANGE UP (ref 5–8)
PLATELET # BLD AUTO: 133 K/UL — LOW (ref 150–400)
POTASSIUM SERPL-MCNC: 3.4 MMOL/L — LOW (ref 3.5–5.3)
POTASSIUM SERPL-SCNC: 3.4 MMOL/L — LOW (ref 3.5–5.3)
PROT ?TM UR-MCNC: 43 MG/DL — HIGH (ref 0–12)
PROT SERPL-MCNC: 5.9 GM/DL — LOW (ref 6–8.3)
PROT UR-MCNC: NEGATIVE MG/DL — SIGNIFICANT CHANGE UP
PROT/CREAT UR-RTO: 0.3 RATIO — HIGH (ref 0–0.2)
RBC # BLD: 4.04 M/UL — SIGNIFICANT CHANGE UP (ref 3.8–5.2)
RBC # FLD: 13.5 % — SIGNIFICANT CHANGE UP (ref 10.3–14.5)
RBC CASTS # UR COMP ASSIST: 0 /HPF — SIGNIFICANT CHANGE UP (ref 0–4)
SODIUM SERPL-SCNC: 140 MMOL/L — SIGNIFICANT CHANGE UP (ref 135–145)
SP GR SPEC: 1.02 — SIGNIFICANT CHANGE UP (ref 1–1.03)
SQUAMOUS # UR AUTO: 4 /HPF — SIGNIFICANT CHANGE UP (ref 0–5)
TROPONIN I, HIGH SENSITIVITY RESULT: 13.18 NG/L — SIGNIFICANT CHANGE UP
TSH SERPL-MCNC: 3.32 UU/ML — SIGNIFICANT CHANGE UP (ref 0.34–4.82)
UROBILINOGEN FLD QL: 0.2 MG/DL — SIGNIFICANT CHANGE UP (ref 0.2–1)
WBC # BLD: 6.79 K/UL — SIGNIFICANT CHANGE UP (ref 3.8–10.5)
WBC # FLD AUTO: 6.79 K/UL — SIGNIFICANT CHANGE UP (ref 3.8–10.5)
WBC UR QL: 6 /HPF — HIGH (ref 0–5)

## 2024-11-07 PROCEDURE — 82570 ASSAY OF URINE CREATININE: CPT

## 2024-11-07 PROCEDURE — 59025 FETAL NON-STRESS TEST: CPT | Mod: 26

## 2024-11-07 PROCEDURE — 87086 URINE CULTURE/COLONY COUNT: CPT

## 2024-11-07 PROCEDURE — 99214 OFFICE O/P EST MOD 30 MIN: CPT

## 2024-11-07 PROCEDURE — 93010 ELECTROCARDIOGRAM REPORT: CPT

## 2024-11-07 PROCEDURE — 85025 COMPLETE CBC W/AUTO DIFF WBC: CPT

## 2024-11-07 PROCEDURE — 84443 ASSAY THYROID STIM HORMONE: CPT

## 2024-11-07 PROCEDURE — 99285 EMERGENCY DEPT VISIT HI MDM: CPT

## 2024-11-07 PROCEDURE — 96375 TX/PRO/DX INJ NEW DRUG ADDON: CPT

## 2024-11-07 PROCEDURE — 83690 ASSAY OF LIPASE: CPT

## 2024-11-07 PROCEDURE — 96374 THER/PROPH/DIAG INJ IV PUSH: CPT

## 2024-11-07 PROCEDURE — 80053 COMPREHEN METABOLIC PANEL: CPT

## 2024-11-07 PROCEDURE — 99284 EMERGENCY DEPT VISIT MOD MDM: CPT | Mod: 25

## 2024-11-07 PROCEDURE — 59025 FETAL NON-STRESS TEST: CPT

## 2024-11-07 PROCEDURE — 93005 ELECTROCARDIOGRAM TRACING: CPT

## 2024-11-07 PROCEDURE — 99221 1ST HOSP IP/OBS SF/LOW 40: CPT | Mod: 25,GC

## 2024-11-07 PROCEDURE — 36415 COLL VENOUS BLD VENIPUNCTURE: CPT

## 2024-11-07 PROCEDURE — 99053 MED SERV 10PM-8AM 24 HR FAC: CPT

## 2024-11-07 PROCEDURE — 84484 ASSAY OF TROPONIN QUANT: CPT

## 2024-11-07 PROCEDURE — 81001 URINALYSIS AUTO W/SCOPE: CPT

## 2024-11-07 PROCEDURE — 84156 ASSAY OF PROTEIN URINE: CPT

## 2024-11-07 RX ORDER — FAMOTIDINE 10 MG/ML
20 INJECTION INTRAVENOUS ONCE
Refills: 0 | Status: COMPLETED | OUTPATIENT
Start: 2024-11-07 | End: 2024-11-07

## 2024-11-07 RX ORDER — CYCLOBENZAPRINE HYDROCHLORIDE 30 MG/1
1 CAPSULE, EXTENDED RELEASE ORAL
Qty: 10 | Refills: 0
Start: 2024-11-07 | End: 2024-11-11

## 2024-11-07 RX ORDER — ACETAMINOPHEN 500 MG
1000 TABLET ORAL ONCE
Refills: 0 | Status: COMPLETED | OUTPATIENT
Start: 2024-11-07 | End: 2024-11-07

## 2024-11-07 RX ORDER — LEVOTHYROXINE SODIUM 88 MCG
1 TABLET ORAL
Refills: 0 | DISCHARGE

## 2024-11-07 RX ORDER — CYCLOBENZAPRINE HYDROCHLORIDE 30 MG/1
5 CAPSULE, EXTENDED RELEASE ORAL ONCE
Refills: 0 | Status: COMPLETED | OUTPATIENT
Start: 2024-11-07 | End: 2024-11-07

## 2024-11-07 RX ORDER — SODIUM CHLORIDE 9 MG/ML
1000 INJECTION, SOLUTION INTRAMUSCULAR; INTRAVENOUS; SUBCUTANEOUS ONCE
Refills: 0 | Status: DISCONTINUED | OUTPATIENT
Start: 2024-11-07 | End: 2024-11-07

## 2024-11-07 RX ADMIN — Medication 400 MILLIGRAM(S): at 09:34

## 2024-11-07 RX ADMIN — FAMOTIDINE 20 MILLIGRAM(S): 10 INJECTION INTRAVENOUS at 09:34

## 2024-11-07 RX ADMIN — CYCLOBENZAPRINE HYDROCHLORIDE 5 MILLIGRAM(S): 30 CAPSULE, EXTENDED RELEASE ORAL at 15:05

## 2024-11-07 NOTE — ED ADULT NURSE NOTE - NSFALLUNIVINTERV_ED_ALL_ED
Bed/Stretcher in lowest position, wheels locked, appropriate side rails in place/Call bell, personal items and telephone in reach/Instruct patient to call for assistance before getting out of bed/chair/stretcher/Non-slip footwear applied when patient is off stretcher/[place] to call system/Physically safe environment - no spills, clutter or unnecessary equipment/Purposeful proactive rounding/Room/bathroom lighting operational, light cord in reach 04-Aug-2017 12:26

## 2024-11-07 NOTE — OB PROVIDER TRIAGE NOTE - BIRTH SEX
CM Spoke with pt today to update that [facility] did have a (+) COVID case.   SHe would like to have Sw look at other TCU options that are able to accept a CPAP for her support. Pt wears a CPAP at baseline    Spoke with her about [facility] [place] and [facility].,    Pt is hopeful that she may improve enough to return home with Home Care    MD stated here most likely through the weekend        Corinne C. White, JOSE FRANCISCO       Female

## 2024-11-07 NOTE — OB RN TRIAGE NOTE - NSNURSINGINSTR_OBGYN_ALL_OB_FT
pt to follow up on the 16th, PTL instructions reviewed. Flexeril sent to pharmacy, instructions given. Pt aware of protein in her urine and will follow up. PIH signs and Symptoms reviewed.

## 2024-11-07 NOTE — ED PROVIDER NOTE - CLINICAL SUMMARY MEDICAL DECISION MAKING FREE TEXT BOX
30 yo female with left upper back pain radiating to left upper abdomen; 34 weeks pregnant; initial ekg non diagnostic; will start with basic labs; pain control and re-eval closely; patient with no chest pain; no sob; no palpable abdominal pain. 30 yo female with left upper back pain radiating to left upper abdomen; 34 weeks pregnant; initial ekg non diagnostic; will start with basic labs; pain control and re-eval closely; patient with no chest pain; no sob; no palpable abdominal pain.    Maia DO: work up discussed with patient and ob resident- mildly elevated alk phos expected; mildly elevated ast- but bp wnl; patient still with left upper back pain; reproducible on exam; no chest pain; no sob; not tachycardic; not hypoxic; not tachypnic; offered cxr but defers until conversation with ob; per ob resident- would like patient to go up to L&D at this time for further evaluation.

## 2024-11-07 NOTE — OB PROVIDER TRIAGE NOTE - HISTORY OF PRESENT ILLNESS
MARVIN YANG is a 29y  at 33weeks 6days GA who presents to L&D for left flank pain. Pt states that pain started primarily in her left shoulder which she elicited to stress as she usually will have tension in that area. 3 days ago pain migrated to her left flank. Pt states that pain radiates to LUQ. 2 days ago pt had non-bloody emesis from pain. Believed pain was due to gas; took gas relief and tylenol at home with no alleviation. Pain continued to worsen and pt was unable to sleep at night so she came to the ED for further evaluation. Pt denies vaginal bleeding, contractions and leakage of fluid. She endorses good fetal movement.  Pt denies headaches, visual disturbances, RUQ pain, epigastric pain and new-onset edema.   She denies any urinary complaints. She denies fevers, chills, nausea, vomiting.   She denies shortness of breath, chest pain, and palpitations. States that pain is worse with deep inspiration.     Pregnancy course is  uncomplicated.       PGYN: -fibroids/-cysts, denied STD hx, denies abnormal PAPs  SH: Denies tobacco use, EtOH use and illicit drug use during the pregnancy; Feels safe at home

## 2024-11-07 NOTE — ED PROVIDER NOTE - OBJECTIVE STATEMENT
Patient is a 28 yo female  at 34 weeks pregnant (contrary to triage); 3-4 day hx of left mid back pain; tried gas-x, massage with no improvement; one episode of vomiting; no current nausea; no abdominal pain; no leakage of fluids; no vaginal bleeding; no chest pain; no sob; no urinary complaints.

## 2024-11-07 NOTE — OB PROVIDER TRIAGE NOTE - ATTENDING COMMENTS
P0 33w6 weeks c/o flank pain, likely MSK improved with flexeril, incidentally found to have proteinuria, to have repeat PIH labs next week, Dr Mckenna made aware, PEC precautions given. P0 33w6 weeks c/o flank pain, likely MSK improved with flexeril, NST reactive, incidentally found to have proteinuria, to have repeat PIH labs next week, Dr Mckenna made aware, PEC precautions given.

## 2024-11-07 NOTE — ED ADULT NURSE NOTE - OBJECTIVE STATEMENT
34 weeks pregnant p/w complaining of left mid back pain for the past 4 days.  AOx4 and in no apparent distress. Cooperative and well appearing. VS Stable. No focal neurological deficits. Skin warm, dry and intact. Normal turgor, no edema noted. Peripheral pulses palpable, equal bilaterally, Cap refill <2sec. No respiratory distress noted, denies SOB or MCC[place]. Full range of motion in all extremities. Mood and affect appropriate to situation.

## 2024-11-07 NOTE — ED ADULT TRIAGE NOTE - CHIEF COMPLAINT QUOTE
Pt ambulatory to ED c/o left upper back pain radiating to chest x3 days.  States pain is worse when laying down. Pt is 3 weeks pregnant. -SOB. On synthroid to "regulate hormones during pregnancy." NKDA.

## 2024-11-07 NOTE — OB PROVIDER TRIAGE NOTE - ADDITIONAL INSTRUCTIONS
You came into the ED for back pain. This resolved with appropriate hydration and muscle relaxer. We will send a prescription of Flexeril 5mg to your pharmacy. You can use this medication if the pain returns. This medication can make you drowsy. Please do not drive while taking this medication. Additionally, please follow up with your OBGYN within 1 week of discharge for further medical management and repeat lab work.

## 2024-11-07 NOTE — OB PROVIDER TRIAGE NOTE - NSICDXPASTMEDICALHX_GEN_ALL_CORE_FT
PAST MEDICAL HISTORY:  GERD (gastroesophageal reflux disease)     Hiatal hernia     Hypothyroidism

## 2024-11-07 NOTE — OB RN TRIAGE NOTE - NSSDOHCURSE_OBGYN_A_OB
You can access the FollowMyHealth Patient Portal offered by [facility] by registering at the following website: [email or web address]. By joining [employer]’s FollowMyHealth portal, you will also be able to view your health information using other applications (apps) compatible with our system.
never

## 2024-11-07 NOTE — ED PROVIDER NOTE - PATIENT PORTAL LINK FT
You can access the FollowMyHealth Patient Portal offered by [facility] by registering at the following website: [email or web address]. By joining [employer]’s FollowMyHealth portal, you will also be able to view your health information using other applications (apps) compatible with our system.

## 2024-11-07 NOTE — OB PROVIDER TRIAGE NOTE - NSHPPHYSICALEXAM_GEN_ALL_CORE
T(C): 36.7 (11-07-24 @ 11:28), Max: 36.7 (11-07-24 @ 11:28)  HR: 63 (11-07-24 @ 11:28) (63 - 86)  BP: 109/77 (11-07-24 @ 11:28) (109/77 - 120/78)  RR: 18 (11-07-24 @ 11:28) (18 - 18)  SpO2: 98% (11-07-24 @ 11:28) (98% - 100%)    Gen: NAD, well-appearing  Heart: S1 S2, RRR  Lungs: CTAB  Abd: soft, gravid  Ext: non-edematous, non-tender  Back: no flank pain   SVE:   SSE: cervix visualized, closed and without any signs of bleeding or drainage, no pooling   FHT: 120bpm  [place]: moderate variability, no contractions T(C): 36.7 (11-07-24 @ 11:28), Max: 36.7 (11-07-24 @ 11:28)  HR: 63 (11-07-24 @ 11:28) (63 - 86)  BP: 109/77 (11-07-24 @ 11:28) (109/77 - 120/78)  RR: 18 (11-07-24 @ 11:28) (18 - 18)  SpO2: 98% (11-07-24 @ 11:28) (98% - 100%)    Gen: NAD, well-appearing  Heart: S1 S2, RRR  Lungs: CTAB  Abd: soft, gravid  Ext: non-edematous, non-tender  Back: no flank pain   SVE:   SSE: cervix visualized, closed and without any signs of bleeding or drainage, no pooling   FHT: 120bpm, moderate variability,  [place]: +contractions T(C): 36.7 (11-07-24 @ 11:28), Max: 36.7 (11-07-24 @ 11:28)  HR: 63 (11-07-24 @ 11:28) (63 - 86)  BP: 109/77 (11-07-24 @ 11:28) (109/77 - 120/78)  RR: 18 (11-07-24 @ 11:28) (18 - 18)  SpO2: 98% (11-07-24 @ 11:28) (98% - 100%)    Gen: NAD, well-appearing  Heart: S1 S2, RRR  Lungs: CTAB  Abd: soft, gravid  Ext: non-edematous, non-tender  Back: no flank pain   VE: closed/long/posterior 1300h  VE: same exam, 1600h    FHT: 120bpm, moderate variability,  [place]: +contractions

## 2024-11-07 NOTE — OB PROVIDER TRIAGE NOTE - NSOBPROVIDERNOTE_OBGYN_ALL_OB_FT
A/P:     - Pain likely MSK in nature   - NST now   Dispo: Continue to observe.     Discussed with Dr. Curtis A/P:     - Pain likely MSK in nature   - NST now   - Serial BP checks q15 mins  Dispo: Continue to observe.     Discussed with Dr. Curtis A/P:   28 yo P0 33w6d c/o flank pain  - Pain likely MSK in nature   - NST now   - Serial BP checks q15 mins  Dispo: Continue to observe.     Discussed with Dr. Curtis    Addendum by Dr Curtis by 1630h  Patient received flexeril, reports improvement in pain. Contractions spaced out after PO hydration with no cervical change. Patient with one borderline BP upon arrival, [facility] labs showing P/C 0.3, LFT mildly above baseline and plt 133, asked to follow up closely as outpatient with Dr Mckenna. PEC precautions given.

## 2024-11-08 ENCOUNTER — NON-APPOINTMENT (OUTPATIENT)
Age: 29
End: 2024-11-08

## 2024-11-08 LAB
CULTURE RESULTS: SIGNIFICANT CHANGE UP
SPECIMEN SOURCE: SIGNIFICANT CHANGE UP

## 2024-11-10 ENCOUNTER — INPATIENT (INPATIENT)
Facility: HOSPITAL | Age: 29
LOS: 3 days | Discharge: HOME | End: 2024-11-14
Attending: OBSTETRICS & GYNECOLOGY | Admitting: OBSTETRICS & GYNECOLOGY
Payer: COMMERCIAL

## 2024-11-10 VITALS
TEMPERATURE: 98 F | DIASTOLIC BLOOD PRESSURE: 76 MMHG | OXYGEN SATURATION: 98 % | HEART RATE: 77 BPM | RESPIRATION RATE: 17 BRPM | SYSTOLIC BLOOD PRESSURE: 131 MMHG

## 2024-11-10 DIAGNOSIS — Z98.890 OTHER SPECIFIED POSTPROCEDURAL STATES: Chronic | ICD-10-CM

## 2024-11-10 DIAGNOSIS — O26.899 OTHER SPECIFIED PREGNANCY RELATED CONDITIONS, UNSPECIFIED TRIMESTER: ICD-10-CM

## 2024-11-10 PROBLEM — K21.9 GASTRO-ESOPHAGEAL REFLUX DISEASE WITHOUT ESOPHAGITIS: Chronic | Status: ACTIVE | Noted: 2024-11-07

## 2024-11-10 PROBLEM — E03.9 HYPOTHYROIDISM, UNSPECIFIED: Chronic | Status: ACTIVE | Noted: 2024-11-07

## 2024-11-10 PROBLEM — K44.9 DIAPHRAGMATIC HERNIA WITHOUT OBSTRUCTION OR GANGRENE: Chronic | Status: ACTIVE | Noted: 2024-11-07

## 2024-11-10 LAB
ALBUMIN SERPL ELPH-MCNC: 2.1 G/DL — LOW (ref 3.3–5)
ALP SERPL-CCNC: 204 U/L — HIGH (ref 40–120)
ALT FLD-CCNC: 64 U/L — SIGNIFICANT CHANGE UP (ref 12–78)
AMNISURE ROM (RUPTURE OF MEMBRANES): POSITIVE
ANION GAP SERPL CALC-SCNC: 8 MMOL/L — SIGNIFICANT CHANGE UP (ref 5–17)
AST SERPL-CCNC: 51 U/L — HIGH (ref 15–37)
BASOPHILS # BLD AUTO: 0.02 K/UL — SIGNIFICANT CHANGE UP (ref 0–0.2)
BASOPHILS # BLD AUTO: 0.02 K/UL — SIGNIFICANT CHANGE UP (ref 0–0.2)
BASOPHILS NFR BLD AUTO: 0.1 % — SIGNIFICANT CHANGE UP (ref 0–2)
BASOPHILS NFR BLD AUTO: 0.3 % — SIGNIFICANT CHANGE UP (ref 0–2)
BILIRUB SERPL-MCNC: 0.3 MG/DL — SIGNIFICANT CHANGE UP (ref 0.2–1.2)
BUN SERPL-MCNC: 6 MG/DL — LOW (ref 7–23)
CALCIUM SERPL-MCNC: 8.3 MG/DL — LOW (ref 8.5–10.1)
CHLORIDE SERPL-SCNC: 109 MMOL/L — HIGH (ref 96–108)
CO2 SERPL-SCNC: 24 MMOL/L — SIGNIFICANT CHANGE UP (ref 22–31)
CREAT SERPL-MCNC: 0.73 MG/DL — SIGNIFICANT CHANGE UP (ref 0.5–1.3)
EGFR: 113 ML/MIN/1.73M2 — SIGNIFICANT CHANGE UP
EOSINOPHIL # BLD AUTO: 0 K/UL — SIGNIFICANT CHANGE UP (ref 0–0.5)
EOSINOPHIL # BLD AUTO: 0.1 K/UL — SIGNIFICANT CHANGE UP (ref 0–0.5)
EOSINOPHIL NFR BLD AUTO: 0 % — SIGNIFICANT CHANGE UP (ref 0–6)
EOSINOPHIL NFR BLD AUTO: 1.4 % — SIGNIFICANT CHANGE UP (ref 0–6)
GLUCOSE SERPL-MCNC: 110 MG/DL — HIGH (ref 70–99)
HCT VFR BLD CALC: 35.6 % — SIGNIFICANT CHANGE UP (ref 34.5–45)
HCT VFR BLD CALC: 39.3 % — SIGNIFICANT CHANGE UP (ref 34.5–45)
HGB BLD-MCNC: 11.9 G/DL — SIGNIFICANT CHANGE UP (ref 11.5–15.5)
HGB BLD-MCNC: 13 G/DL — SIGNIFICANT CHANGE UP (ref 11.5–15.5)
IMM GRANULOCYTES NFR BLD AUTO: 0.6 % — SIGNIFICANT CHANGE UP (ref 0–0.9)
IMM GRANULOCYTES NFR BLD AUTO: 0.8 % — SIGNIFICANT CHANGE UP (ref 0–0.9)
LDH SERPL L TO P-CCNC: 254 U/L — HIGH (ref 84–241)
LYMPHOCYTES # BLD AUTO: 0.83 K/UL — LOW (ref 1–3.3)
LYMPHOCYTES # BLD AUTO: 1.92 K/UL — SIGNIFICANT CHANGE UP (ref 1–3.3)
LYMPHOCYTES # BLD AUTO: 27.7 % — SIGNIFICANT CHANGE UP (ref 13–44)
LYMPHOCYTES # BLD AUTO: 5.8 % — LOW (ref 13–44)
MCHC RBC-ENTMCNC: 29.7 PG — SIGNIFICANT CHANGE UP (ref 27–34)
MCHC RBC-ENTMCNC: 30.2 PG — SIGNIFICANT CHANGE UP (ref 27–34)
MCHC RBC-ENTMCNC: 33.1 G/DL — SIGNIFICANT CHANGE UP (ref 32–36)
MCHC RBC-ENTMCNC: 33.4 G/DL — SIGNIFICANT CHANGE UP (ref 32–36)
MCV RBC AUTO: 89.7 FL — SIGNIFICANT CHANGE UP (ref 80–100)
MCV RBC AUTO: 90.4 FL — SIGNIFICANT CHANGE UP (ref 80–100)
MONOCYTES # BLD AUTO: 0.39 K/UL — SIGNIFICANT CHANGE UP (ref 0–0.9)
MONOCYTES # BLD AUTO: 0.65 K/UL — SIGNIFICANT CHANGE UP (ref 0–0.9)
MONOCYTES NFR BLD AUTO: 2.7 % — SIGNIFICANT CHANGE UP (ref 2–14)
MONOCYTES NFR BLD AUTO: 9.4 % — SIGNIFICANT CHANGE UP (ref 2–14)
NEUTROPHILS # BLD AUTO: 13.07 K/UL — HIGH (ref 1.8–7.4)
NEUTROPHILS # BLD AUTO: 4.2 K/UL — SIGNIFICANT CHANGE UP (ref 1.8–7.4)
NEUTROPHILS NFR BLD AUTO: 60.6 % — SIGNIFICANT CHANGE UP (ref 43–77)
NEUTROPHILS NFR BLD AUTO: 90.6 % — HIGH (ref 43–77)
PLATELET # BLD AUTO: 158 K/UL — SIGNIFICANT CHANGE UP (ref 150–400)
PLATELET # BLD AUTO: 159 K/UL — SIGNIFICANT CHANGE UP (ref 150–400)
POTASSIUM SERPL-MCNC: 3.6 MMOL/L — SIGNIFICANT CHANGE UP (ref 3.5–5.3)
POTASSIUM SERPL-SCNC: 3.6 MMOL/L — SIGNIFICANT CHANGE UP (ref 3.5–5.3)
PROT SERPL-MCNC: 5.4 GM/DL — LOW (ref 6–8.3)
RBC # BLD: 3.94 M/UL — SIGNIFICANT CHANGE UP (ref 3.8–5.2)
RBC # BLD: 4.38 M/UL — SIGNIFICANT CHANGE UP (ref 3.8–5.2)
RBC # FLD: 13.4 % — SIGNIFICANT CHANGE UP (ref 10.3–14.5)
RBC # FLD: 13.5 % — SIGNIFICANT CHANGE UP (ref 10.3–14.5)
SODIUM SERPL-SCNC: 141 MMOL/L — SIGNIFICANT CHANGE UP (ref 135–145)
URATE SERPL-MCNC: 5.6 MG/DL — SIGNIFICANT CHANGE UP (ref 2.5–7)
WBC # BLD: 14.43 K/UL — HIGH (ref 3.8–10.5)
WBC # BLD: 6.93 K/UL — SIGNIFICANT CHANGE UP (ref 3.8–10.5)
WBC # FLD AUTO: 14.43 K/UL — HIGH (ref 3.8–10.5)
WBC # FLD AUTO: 6.93 K/UL — SIGNIFICANT CHANGE UP (ref 3.8–10.5)

## 2024-11-10 PROCEDURE — 86780 TREPONEMA PALLIDUM: CPT

## 2024-11-10 PROCEDURE — 85027 COMPLETE CBC AUTOMATED: CPT

## 2024-11-10 PROCEDURE — 83615 LACTATE (LD) (LDH) ENZYME: CPT

## 2024-11-10 PROCEDURE — 36415 COLL VENOUS BLD VENIPUNCTURE: CPT

## 2024-11-10 PROCEDURE — 88307 TISSUE EXAM BY PATHOLOGIST: CPT

## 2024-11-10 PROCEDURE — 88307 TISSUE EXAM BY PATHOLOGIST: CPT | Mod: 26

## 2024-11-10 PROCEDURE — 84112 EVAL AMNIOTIC FLUID PROTEIN: CPT

## 2024-11-10 PROCEDURE — 84550 ASSAY OF BLOOD/URIC ACID: CPT

## 2024-11-10 PROCEDURE — 86900 BLOOD TYPING SEROLOGIC ABO: CPT

## 2024-11-10 PROCEDURE — 80053 COMPREHEN METABOLIC PANEL: CPT

## 2024-11-10 PROCEDURE — 86901 BLOOD TYPING SEROLOGIC RH(D): CPT

## 2024-11-10 PROCEDURE — 86850 RBC ANTIBODY SCREEN: CPT

## 2024-11-10 PROCEDURE — 59510 CESAREAN DELIVERY: CPT

## 2024-11-10 PROCEDURE — 83735 ASSAY OF MAGNESIUM: CPT

## 2024-11-10 PROCEDURE — 85025 COMPLETE CBC W/AUTO DIFF WBC: CPT

## 2024-11-10 RX ORDER — IBUPROFEN 200 MG
600 TABLET ORAL EVERY 6 HOURS
Refills: 0 | Status: COMPLETED | OUTPATIENT
Start: 2024-11-10 | End: 2025-10-09

## 2024-11-10 RX ORDER — DIPHENHYDRAMINE HCL 12.5MG/5ML
25 ELIXIR ORAL EVERY 6 HOURS
Refills: 0 | Status: DISCONTINUED | OUTPATIENT
Start: 2024-11-10 | End: 2024-11-14

## 2024-11-10 RX ORDER — BETAMETHASONE SODIUM PHOSPHATE AND BETAMETHASONE ACETATE 3; 3 MG/ML; MG/ML
12 INJECTION, SUSPENSION INTRA-ARTICULAR; INTRALESIONAL; INTRAMUSCULAR EVERY 24 HOURS
Refills: 0 | Status: DISCONTINUED | OUTPATIENT
Start: 2024-11-10 | End: 2024-11-11

## 2024-11-10 RX ORDER — OXYCODONE HYDROCHLORIDE 30 MG/1
5 TABLET ORAL
Refills: 0 | Status: DISCONTINUED | OUTPATIENT
Start: 2024-11-10 | End: 2024-11-14

## 2024-11-10 RX ORDER — MAGNESIUM SULFATE IN 0.9% NACL 2 G/50 ML
4 INTRAVENOUS SOLUTION, PIGGYBACK (ML) INTRAVENOUS ONCE
Refills: 0 | Status: COMPLETED | OUTPATIENT
Start: 2024-11-10 | End: 2024-11-10

## 2024-11-10 RX ORDER — AMPICILLIN 2 G/1
2 INJECTION, POWDER, FOR SOLUTION INTRAVENOUS ONCE
Refills: 0 | Status: COMPLETED | OUTPATIENT
Start: 2024-11-10 | End: 2024-11-10

## 2024-11-10 RX ORDER — MAGNESIUM SULFATE IN 0.9% NACL 2 G/50 ML
2 INTRAVENOUS SOLUTION, PIGGYBACK (ML) INTRAVENOUS
Qty: 40 | Refills: 0 | Status: DISCONTINUED | OUTPATIENT
Start: 2024-11-10 | End: 2024-11-10

## 2024-11-10 RX ORDER — MAGNESIUM SULFATE IN 0.9% NACL 2 G/50 ML
2 INTRAVENOUS SOLUTION, PIGGYBACK (ML) INTRAVENOUS
Qty: 40 | Refills: 0 | Status: DISCONTINUED | OUTPATIENT
Start: 2024-11-10 | End: 2024-11-11

## 2024-11-10 RX ORDER — MODIFIED LANOLIN
1 OINTMENT (GRAM) TOPICAL EVERY 6 HOURS
Refills: 0 | Status: DISCONTINUED | OUTPATIENT
Start: 2024-11-10 | End: 2024-11-14

## 2024-11-10 RX ORDER — LABETALOL HCL 200 MG
20 TABLET ORAL ONCE
Refills: 0 | Status: COMPLETED | OUTPATIENT
Start: 2024-11-10 | End: 2024-11-10

## 2024-11-10 RX ORDER — OXYTOCIN IN D5W-0.2% SODIUM CL 15/250 ML
42 PLASTIC BAG, INJECTION (ML) INTRAVENOUS
Qty: 30 | Refills: 0 | Status: DISCONTINUED | OUTPATIENT
Start: 2024-11-10 | End: 2024-11-14

## 2024-11-10 RX ORDER — KETOROLAC TROMETHAMINE 30 MG/ML
30 INJECTION INTRAMUSCULAR; INTRAVENOUS EVERY 6 HOURS
Refills: 0 | Status: DISCONTINUED | OUTPATIENT
Start: 2024-11-10 | End: 2024-11-11

## 2024-11-10 RX ORDER — MAGNESIUM SULFATE IN 0.9% NACL 2 G/50 ML
4 INTRAVENOUS SOLUTION, PIGGYBACK (ML) INTRAVENOUS ONCE
Refills: 0 | Status: DISCONTINUED | OUTPATIENT
Start: 2024-11-10 | End: 2024-11-10

## 2024-11-10 RX ORDER — AMPICILLIN 2 G/1
1 INJECTION, POWDER, FOR SOLUTION INTRAVENOUS EVERY 4 HOURS
Refills: 0 | Status: DISCONTINUED | OUTPATIENT
Start: 2024-11-10 | End: 2024-11-11

## 2024-11-10 RX ORDER — ACETAMINOPHEN 500 MG
975 TABLET ORAL
Refills: 0 | Status: DISCONTINUED | OUTPATIENT
Start: 2024-11-10 | End: 2024-11-14

## 2024-11-10 RX ORDER — CHLORHEXIDINE GLUCONATE 40 MG/ML
1 SOLUTION TOPICAL DAILY
Refills: 0 | Status: DISCONTINUED | OUTPATIENT
Start: 2024-11-10 | End: 2024-11-11

## 2024-11-10 RX ORDER — IBUPROFEN 200 MG
600 TABLET ORAL EVERY 6 HOURS
Refills: 0 | Status: DISCONTINUED | OUTPATIENT
Start: 2024-11-10 | End: 2024-11-14

## 2024-11-10 RX ORDER — MAGNESIUM HYDROXIDE 1200 MG/15ML
30 SUSPENSION ORAL
Refills: 0 | Status: DISCONTINUED | OUTPATIENT
Start: 2024-11-10 | End: 2024-11-14

## 2024-11-10 RX ORDER — CITRIC ACID/SODIUM CITRATE 334-500MG
30 SOLUTION, ORAL ORAL ONCE
Refills: 0 | Status: DISCONTINUED | OUTPATIENT
Start: 2024-11-10 | End: 2024-11-11

## 2024-11-10 RX ORDER — SIMETHICONE 80 MG/1
80 TABLET, CHEWABLE ORAL EVERY 4 HOURS
Refills: 0 | Status: DISCONTINUED | OUTPATIENT
Start: 2024-11-10 | End: 2024-11-14

## 2024-11-10 RX ORDER — FAMOTIDINE 10 MG/ML
20 INJECTION INTRAVENOUS ONCE
Refills: 0 | Status: COMPLETED | OUTPATIENT
Start: 2024-11-10 | End: 2024-11-10

## 2024-11-10 RX ORDER — ACETAMINOPHEN 500 MG
1000 TABLET ORAL ONCE
Refills: 0 | Status: COMPLETED | OUTPATIENT
Start: 2024-11-10 | End: 2024-11-10

## 2024-11-10 RX ORDER — OXYTOCIN IN D5W-0.2% SODIUM CL 15/250 ML
167 PLASTIC BAG, INJECTION (ML) INTRAVENOUS
Qty: 30 | Refills: 0 | Status: DISCONTINUED | OUTPATIENT
Start: 2024-11-10 | End: 2024-11-14

## 2024-11-10 RX ORDER — OXYCODONE HYDROCHLORIDE 30 MG/1
5 TABLET ORAL ONCE
Refills: 0 | Status: DISCONTINUED | OUTPATIENT
Start: 2024-11-10 | End: 2024-11-14

## 2024-11-10 RX ORDER — CLOSTRIDIUM TETANI TOXOID ANTIGEN (FORMALDEHYDE INACTIVATED), CORYNEBACTERIUM DIPHTHERIAE TOXOID ANTIGEN (FORMALDEHYDE INACTIVATED), BORDETELLA PERTUSSIS TOXOID ANTIGEN (GLUTARALDEHYDE INACTIVATED), BORDETELLA PERTUSSIS FILAMENTOUS HEMAGGLUTININ ANTIGEN (FORMALDEHYDE INACTIVATED), BORDETELLA PERTUSSIS PERTACTIN ANTIGEN, AND BORDETELLA PERTUSSIS FIMBRIAE 2/3 ANTIGEN 5; 2; 2.5; 5; 3; 5 [LF]/.5ML; [LF]/.5ML; UG/.5ML; UG/.5ML; UG/.5ML; UG/.5ML
0.5 INJECTION, SUSPENSION INTRAMUSCULAR ONCE
Refills: 0 | Status: DISCONTINUED | OUTPATIENT
Start: 2024-11-10 | End: 2024-11-14

## 2024-11-10 RX ORDER — AZITHROMYCIN DIHYDRATE 200 MG/5ML
500 POWDER, FOR SUSPENSION ORAL ONCE
Refills: 0 | Status: COMPLETED | OUTPATIENT
Start: 2024-11-10 | End: 2024-11-10

## 2024-11-10 RX ORDER — ENOXAPARIN SODIUM 40MG/0.4ML
40 SYRINGE (ML) SUBCUTANEOUS EVERY 24 HOURS
Refills: 0 | Status: DISCONTINUED | OUTPATIENT
Start: 2024-11-11 | End: 2024-11-14

## 2024-11-10 RX ADMIN — AMPICILLIN 108 GRAM(S): 2 INJECTION, POWDER, FOR SOLUTION INTRAVENOUS at 07:37

## 2024-11-10 RX ADMIN — Medication 125 MILLILITER(S): at 03:17

## 2024-11-10 RX ADMIN — AMPICILLIN 108 GRAM(S): 2 INJECTION, POWDER, FOR SOLUTION INTRAVENOUS at 11:03

## 2024-11-10 RX ADMIN — Medication 20 MILLIGRAM(S): at 17:00

## 2024-11-10 RX ADMIN — Medication 125 MILLILITER(S): at 11:03

## 2024-11-10 RX ADMIN — Medication 400 MILLIGRAM(S): at 16:35

## 2024-11-10 RX ADMIN — Medication 300 GRAM(S): at 17:16

## 2024-11-10 RX ADMIN — AMPICILLIN 216 GRAM(S): 2 INJECTION, POWDER, FOR SOLUTION INTRAVENOUS at 03:17

## 2024-11-10 RX ADMIN — Medication 400 MILLIGRAM(S): at 21:33

## 2024-11-10 RX ADMIN — Medication 50 GM/HR: at 17:37

## 2024-11-10 RX ADMIN — AZITHROMYCIN DIHYDRATE 255 MILLIGRAM(S): 200 POWDER, FOR SUSPENSION ORAL at 16:05

## 2024-11-10 RX ADMIN — KETOROLAC TROMETHAMINE 30 MILLIGRAM(S): 30 INJECTION INTRAMUSCULAR; INTRAVENOUS at 16:07

## 2024-11-10 RX ADMIN — Medication 125 MILLILITER(S): at 11:38

## 2024-11-10 RX ADMIN — FAMOTIDINE 20 MILLIGRAM(S): 10 INJECTION INTRAVENOUS at 16:05

## 2024-11-10 RX ADMIN — BETAMETHASONE SODIUM PHOSPHATE AND BETAMETHASONE ACETATE 6 MILLIGRAM(S): 3; 3 INJECTION, SUSPENSION INTRA-ARTICULAR; INTRALESIONAL; INTRAMUSCULAR at 03:17

## 2024-11-10 RX ADMIN — KETOROLAC TROMETHAMINE 30 MILLIGRAM(S): 30 INJECTION INTRAMUSCULAR; INTRAVENOUS at 23:53

## 2024-11-10 NOTE — OB PROVIDER LABOR PROGRESS NOTE - NS_OBIHIFHRDETAILS_OBGYN_ALL_OB_FT
baseline 130, moderate variability, +accels, -decels
baseline 130, moderate variability, +accels, -decels
130s with min to mod variability late decels
baseline 125, minimal variability, -accels, + recurrent late decels

## 2024-11-10 NOTE — OB PROVIDER DELIVERY SUMMARY - NSPROVIDERDELIVERYNOTE_OBGYN_ALL_OB_FT
Brief  Delivery Summary    Procedure: pLTCS for fetal intolerance of labor, minimal variability with recurrent late decels  Findings: Viable male infant, apgars 7/9, weight 1990,  cephalic presentation. nuchal x1. Grossly normal uterus. Fallopian tubes and ovaries palpated to be normal not visualized  Single layer uterine closure with monocryl  rectus with chromic  sub Q w vicryl  SubQ skin closure - quill  EBL: 600  UOP: 300  Fluids in OR:  1L  Complications: Nuchal cord x1

## 2024-11-10 NOTE — OB RN DELIVERY SUMMARY - NSSELHIDDEN_OBGYN_ALL_OB_FT
[NS_DeliveryAttending1_OBGYN_ALL_OB_FT:[id number]],[NS_DeliveryAssist1_OBGYN_ALL_OB_FT:[id number]]

## 2024-11-10 NOTE — OB RN DELIVERY SUMMARY - NS_SEROLOGYDONE_OBGYN_ALL_OB
Called and spoke with pt in regards of his medications. Pt informed me that he wants his allopurinoL (ZYLOPRIM) 100 MG tablet and DULoxetine (CYMBALTA) 20 MG sent to [facility] instead of [facility]. Please advise.  
Yes

## 2024-11-10 NOTE — OB PROVIDER DELIVERY SUMMARY - NSPOSTOPDXA_OBGYN_ALL_OB
Bed: 34qTrk  Expected date:   Expected time:   Means of arrival:   Comments:  #1   Same as Pre-Op Diagnosis

## 2024-11-10 NOTE — OB RN PATIENT PROFILE - NS_PRENATALLOC_OBGYN_ALL_OB
[facility]    S/P colectomy 2013 Johnson Memorial MD Office

## 2024-11-10 NOTE — OB RN DELIVERY SUMMARY - AS DELIV COMPLICATIONS OB
premature rupture of membranes prior to labor abnormal fetal heart rate tracing/premature rupture of membranes prior to labor

## 2024-11-10 NOTE — OB RN DELIVERY SUMMARY - NS_LABORCHARACTER_OBGYN_ALL_OB
Induction of labor-Medicinal/External electronic FM/Antibiotics in labor/Steroids in labor/Fetal intolerance

## 2024-11-10 NOTE — OB PROVIDER DELIVERY SUMMARY - NSSELHIDDEN_OBGYN_ALL_OB_FT
[NS_DeliveryAttending1_OBGYN_ALL_OB_FT:[id number]==],[NS_DeliveryAssist1_OBGYN_ALL_OB_FT:[id number]]

## 2024-11-10 NOTE — OB PROVIDER H&P - NSHPPHYSICALEXAM_GEN_ALL_CORE
Gen: NAD, well-appearing   Abd: Soft, gravid  Ext: non-tender, non-edematous  SVE:  C/L/H  SSE: gross pooling visualized, no bleeding, cervix appears closed. Amnisure positive  Bedside sono: vtx  FHT: baseline 135, moderate variability, + accels, - decels  [place]: irregular contractions

## 2024-11-10 NOTE — OB RN DELIVERY SUMMARY - NS_SPECIMENTYPE_OBGYN_ALL_OB
[FreeTextEntry1] : viral rash ?? 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

## 2024-11-10 NOTE — OB PROVIDER H&P - HISTORY OF PRESENT ILLNESS
29y  at 34w2d GA by LMP who presents to L&D for leakage of fluid. Pt reports large gush of fluid at 1am this morning. Onset of irregular contractions after leakage. Denies vaginal bleeding. +FM. No other acute complaints.     Prenatal course uncomplicated.      POB: , current   PGYN: -fibroids, -ovarian cysts, denies STD hx, denies abnormal PAPs   PMH: hypothyroidism  PSH: Denies  SH: Denies EtOH, tobacco and illicit drug use during this pregnancy; feels safe at home   Meds: PNVs, levothyroxine 25mcg  Allergies: NKDA

## 2024-11-10 NOTE — OB PROVIDER LABOR PROGRESS NOTE - NS_SUBJECTIVE/OBJECTIVE_OBGYN_ALL_OB_FT
MD 1    PT comf with epidural  Pt has been having late decelerations with every contraction and FHR now minimal to moderate, no improvement with resuscitative measures.   po cytotec for 12:30 pm was held
Pt comfortable s/p epidural
tracing note
Patient is comfortable

## 2024-11-10 NOTE — OB PROVIDER H&P - ATTENDING COMMENTS
Attending Note    Patient is a  @ 34.2 weeks GA, a/w PPROM, as documented above.  Admit to L+D  Maintain continuous monitoring  Ampicillin secondary to being GBS unknown and .   Plan to start buccal cytotec for IOL  BMZ for FLM in setting of  PROM  Obstetrical consent reviewed and obtained.  Patient agrees to the above.

## 2024-11-10 NOTE — OB RN DELIVERY SUMMARY - NS_SEPSISRSKCALC_OBGYN_ALL_OB_FT
EOS calculated successfully. EOS Risk Factor: 0.5/1000 live births ([facility] national incidence); GA=34w2d; Temp=97.7; ROM=13.867; GBS='Unknown'; Antibiotics='Broad spectrum antibiotics > 4 hrs prior to birth'

## 2024-11-10 NOTE — OB PROVIDER H&P - ASSESSMENT
A/P: 29y  @ 34w2d admitted to L&D for PPROM  -Admit to L&D  -Consent  -Admission labs  -IV fluids  -Labor: PPROM, irregular contractions, plan for bCYTO for IOL  -Fetus: Cat I tracing. Continuous toco and fetal monitoring.   -GBS: Unknown, ampicillin for GBS ppx required in  patient   -Analgesia: prn  -DVT ppx: Ambulate and SCD's while in bed   - BMZ discussed for FLM, all questions answered, pt agreeable     Discussed with Dr. Morrow

## 2024-11-10 NOTE — OB NEONATOLOGY/PEDIATRICIAN DELIVERY SUMMARY - NSPEDSNEONOTESA_OBGYN_ALL_OB_FT
Attended Delivery Note (Pediatrics/Neonatology):  Requested to attend delivery by (OB attending Dr Jud Natarajan_)   34 2/7 wk pregnancy of 29__ yo G 1  P _0000 ; BT O+, ASHLI neg; Prenatal labs Reviewed and reassuring, GBS unknown       Maternal Med/Surg Hx: maternal thyroid ____ ; Other ______ ; Meds - RX, OTC, Herbal ___________       Family/Social Hx:  (include ETOH; Tobacco; Recreation RX, Support system)       Pregnancy Hx:  Obstetrical clinic visit pattern _______; Imaging - dating ____ ; anatomy _____ ; cardiac _______; maternal diabetes _______; BPP/NST _____ ; Genetic testing _____;  risk factors _______            Labor:  start ____; ROM ______ (duration); AF (characteristics) ______; temperature patterns; chorioamnionitis ______ ; EOS _____ ; Pain control/meds ____        Delivery:  Cephalic, breech, transverse _____ ; vaginal vs C/S (elective, emergent).  Delayed cord clamping            Resuscitation:  Basic vs advanced.                    Apgar scores (until 20 mins &/or 7) _______      Screening PE:  General ____ ; Resp ______ ; CV _________; Abd/Liver _______;  Skin ________ ; Neuro _________; Back _________ ; Limbs ________      Post resuscitation:   Respiratory support ____ ; Glucose Screening ______ ; Cord Gases _______ ; Placenta study ______ ; Thermal Support. Circumcision _____        Disposition:  NBN/NICU       Continuing care:  Pediatrician (in-hospital  _________ , vs outside hospital ________ ); Nutritional patterns human milk vs cow based milks Attended Delivery Note (Pediatrics/Neonatology):  Requested to attend delivery by (OB attending Dr Jud Natarajan_)   34 2/7 wk pregnancy of 29__ yo G 1  P _0000 ; BT O+, ASHLI neg; Prenatal labs Reviewed and reassuring, GBS unknown       Maternal Med/Surg Hx: maternal thyroid present on synthroid ; Other none; Meds -synthroid only       Family/Social Hx:  (denies ETOH; Tobacco; or Recreation RX use, Support system - father/spouse present in )       Pregnancy Hx:  Obstetrical clinic visit pattern multiple visits, well followed, outpatient chart reviewed in detail.  Imaging - dating ~ 10 week; anatomy ~ 21 weeks - all acceptable.; cardiac no fetal echo, but MFM US was reassuring.  maternal diabetes -- acceptable GTT x 2.  BPP/NST reassuring earlier in pregnancy but NRFHT's during induction 11-10 afternoon; Genetic testing NIPT reassuring.  Risk factors PPROM... patient undergoing an inducation           Labor:  PPROM < 14 hours; AF clear with adequate volumes; temperature patterns afebrile, T Max ~ 36.5; chorioamnionitis - none, but had one dose of [id number] Noiv @ 0317 hours, Betamethasone a@ 0315 hours; EOS 0.29 ; Pain control/meds epidural_        Delivery:  Cephalic, breech,  CAN x 1, left caput succadaneum 6 cm round; C/S ( emergent).  Delayed cord clamping x 60 secs            Resuscitation:  advanced.  Thermal and Airway management - standard, blunted progression of Spo2's responded to CPAP 5-6, 30% at 4;51 min:secs, duration 20 sec's; Positive Pressure Ventilation @ 30 % 20/6 with adequate chest movement at 5:00 mins x 45 sec's showed improved response, returened to famCPAP at 5:40 mins:sec's, with renewed blunting of SpO2 patterns albeit HR was 120's, Positive Pressure Ventilation via fm resumed 6:45 min:sec's for ~ 1 min after which SpO2 patterns resumed acceptable trends, with HR's in 150's.  Patient with intermittent feeble breathing efforts despite stimulation, until ~ 8 mins of life.  Then remained stable on 21 % oxygen with HR's in 150's, SpO2's in 92 to 97 range with good tone, lusty cry and acceptable PE                  Apgar scores 7/9      Screening PE:  General alert, active, lusty cry intermittent until ~ 8 mins, then sustained ; Resp BS - equal, improving quality, no rhochi or rales, minimal retraction pattern improving in first hour of life.; CV jno murmurs in first 30 mins; adequate pulse and perfusion pattern; acceptable precordial activity; no liver or cranial bruits.  Abd/Liver 3 V cord, no HSM or organomegaly, soft/nontender, no renal masses;  Skin clear; Neuro acceptable screening tone and movement patterns.  Back intact superficial spine; Limbs - acceptable neurovascular,digit, long bone and joint exams on arms and legs.      Post resuscitation:   Respiratory support RA ; Glucose Screening 31 in NICU; Cord Gases pending, Cap Gas TBD ; Placenta study recommended_ ; Thermal Support. Circumcision _____        Disposition:  NICU       Continuing care:  Pediatrician (in-hospital  _________ , vs outside hospital ________ ); Nutritional patterns human milk vs cow based milks = human milk when available.  Desire Circ for 'Lolita', desire Hep B Vaccine

## 2024-11-10 NOTE — CHART NOTE - NSCHARTNOTEFT_GEN_A_CORE
Pt evaluated for sever range BPS. States that she is in pain s/p   No blurry vision, HA. upper abd pain, CP, SOB  Edema in legs 2 + pitting    Vital Signs Last 24 Hrs  T(C): 36.5 (10 Nov 2024 15:40), Max: 36.5 (10 Nov 2024 03:45)  T(F): 97.7 (10 Nov 2024 15:40), Max: 97.7 (10 Nov 2024 03:45)  HR: 105 (10 Nov 2024 16:40) (77 - 108)  BP: 158/87 (10 Nov 2024 16:40) (131/76 - 162/93)  BP(mean): 103 (10 Nov 2024 16:40) (69 - 122)  RR: 25 (10 Nov 2024 16:40) (17 - 34)  SpO2: 96% (10 Nov 2024 16:40) (96% - 99%)    Parameters below as of 10 Nov 2024 03:51  Patient On (Oxygen Delivery Method): room air      Push IVP labetalol 20   Start magnesium sulfate for prophylaxis  stat PIH labs Pt evaluated for sever range BPS. States that she is in pain s/p   No blurry vision, HA. upper abd pain, CP, SOB  Edema in legs 2 + pitting    Vital Signs Last 24 Hrs  T(C): 36.5 (10 Nov 2024 15:40), Max: 36.5 (10 Nov 2024 03:45)  T(F): 97.7 (10 Nov 2024 15:40), Max: 97.7 (10 Nov 2024 03:45)  HR: 105 (10 Nov 2024 16:40) (77 - 108)  BP: 158/87 (10 Nov 2024 16:40) (131/76 - 162/93)  BP(mean): 103 (10 Nov 2024 16:40) (69 - 122)  RR: 25 (10 Nov 2024 16:40) (17 - 34)  SpO2: 96% (10 Nov 2024 16:40) (96% - 99%)    Parameters below as of 10 Nov 2024 03:51  Patient On (Oxygen Delivery Method): room air      Push IVP labetalol 20   Start magnesium sulfate for prophylaxis  stat PIH labs        MD1 6:28 PM  Pt seen and examined. Agree with note above. Had disc with husb and patient.   Clinical status and reasons for magnesium disc.  Will monitor BPS, currently in normal range. If cont to increase in mild range will start on procardia.   Will check labs.    KAYODE

## 2024-11-10 NOTE — OB PROVIDER H&P - NSLOWPPHRISK_OBGYN_A_OB
No previous uterine incision/[place] Pregnancy/Less than or equal to 4 previous vaginal births/No known bleeding disorder/No history of postpartum hemorrhage/No other PPH risks indicated

## 2024-11-10 NOTE — OB PROVIDER LABOR PROGRESS NOTE - ASSESSMENT
FHT cat I  S/p bcyto x1, will continue  No regular ctx yet
A/P: 34 + wks with PROM  s/p cytotec buccal.  Fetal intolerance to labor.  Disc with patient and her partner.  Advised that she is remote from delivery and fetus is currently not tolerating labor.  Adv pt that we recommend abdominal delivery.  Will give pt 5 min to think about her clinical situation. Will have OR and staff prepared.    PLEE
FHT cat II  Decels with each contraction for the last 1hr +  Counseled on recommendation for  delivery due to FHT cat II remote from delivery. All questions answered. Will proceed to OR for . 
FHT cat I  Regular ctx    PPROM 34wk  bcyto x2, continue with cytotec.

## 2024-11-10 NOTE — OB RN INTRAOPERATIVE NOTE - NSSELHIDDEN_OBGYN_ALL_OB_FT
[NS_DeliveryAttending1_OBGYN_ALL_OB_FT:[id number]==],[NS_DeliveryAssist1_OBGYN_ALL_OB_FT:[id number]]

## 2024-11-11 ENCOUNTER — TRANSCRIPTION ENCOUNTER (OUTPATIENT)
Age: 29
End: 2024-11-11

## 2024-11-11 ENCOUNTER — APPOINTMENT (OUTPATIENT)
Dept: OBGYN | Facility: CLINIC | Age: 29
End: 2024-11-11

## 2024-11-11 DIAGNOSIS — O21.2 LATE VOMITING OF PREGNANCY: ICD-10-CM

## 2024-11-11 DIAGNOSIS — O26.893 OTHER SPECIFIED PREGNANCY RELATED CONDITIONS, THIRD TRIMESTER: ICD-10-CM

## 2024-11-11 DIAGNOSIS — E03.9 HYPOTHYROIDISM, UNSPECIFIED: ICD-10-CM

## 2024-11-11 DIAGNOSIS — O99.283 ENDOCRINE, NUTRITIONAL AND METABOLIC DISEASES COMPLICATING PREGNANCY, THIRD TRIMESTER: ICD-10-CM

## 2024-11-11 DIAGNOSIS — K21.9 GASTRO-ESOPHAGEAL REFLUX DISEASE WITHOUT ESOPHAGITIS: ICD-10-CM

## 2024-11-11 DIAGNOSIS — K44.9 DIAPHRAGMATIC HERNIA WITHOUT OBSTRUCTION OR GANGRENE: ICD-10-CM

## 2024-11-11 DIAGNOSIS — Z3A.33 33 WEEKS GESTATION OF PREGNANCY: ICD-10-CM

## 2024-11-11 DIAGNOSIS — O99.613 DISEASES OF THE DIGESTIVE SYSTEM COMPLICATING PREGNANCY, THIRD TRIMESTER: ICD-10-CM

## 2024-11-11 DIAGNOSIS — O09.293 SUPERVISION OF PREGNANCY WITH OTHER POOR REPRODUCTIVE OR OBSTETRIC HISTORY, THIRD TRIMESTER: ICD-10-CM

## 2024-11-11 DIAGNOSIS — R79.89 OTHER SPECIFIED ABNORMAL FINDINGS OF BLOOD CHEMISTRY: ICD-10-CM

## 2024-11-11 DIAGNOSIS — R10.12 LEFT UPPER QUADRANT PAIN: ICD-10-CM

## 2024-11-11 LAB
ALBUMIN SERPL ELPH-MCNC: 2 G/DL — LOW (ref 3.3–5)
ALP SERPL-CCNC: 164 U/L — HIGH (ref 40–120)
ALT FLD-CCNC: 59 U/L — SIGNIFICANT CHANGE UP (ref 12–78)
ANION GAP SERPL CALC-SCNC: 6 MMOL/L — SIGNIFICANT CHANGE UP (ref 5–17)
AST SERPL-CCNC: 49 U/L — HIGH (ref 15–37)
BASOPHILS # BLD AUTO: 0.03 K/UL — SIGNIFICANT CHANGE UP (ref 0–0.2)
BASOPHILS NFR BLD AUTO: 0.2 % — SIGNIFICANT CHANGE UP (ref 0–2)
BILIRUB SERPL-MCNC: 0.3 MG/DL — SIGNIFICANT CHANGE UP (ref 0.2–1.2)
BUN SERPL-MCNC: 5 MG/DL — LOW (ref 7–23)
CALCIUM SERPL-MCNC: 7.6 MG/DL — LOW (ref 8.5–10.1)
CHLORIDE SERPL-SCNC: 109 MMOL/L — HIGH (ref 96–108)
CO2 SERPL-SCNC: 24 MMOL/L — SIGNIFICANT CHANGE UP (ref 22–31)
CREAT SERPL-MCNC: 0.73 MG/DL — SIGNIFICANT CHANGE UP (ref 0.5–1.3)
EGFR: 113 ML/MIN/1.73M2 — SIGNIFICANT CHANGE UP
EOSINOPHIL # BLD AUTO: 0 K/UL — SIGNIFICANT CHANGE UP (ref 0–0.5)
EOSINOPHIL NFR BLD AUTO: 0 % — SIGNIFICANT CHANGE UP (ref 0–6)
GLUCOSE SERPL-MCNC: 134 MG/DL — HIGH (ref 70–99)
HCT VFR BLD CALC: 30.5 % — LOW (ref 34.5–45)
HGB BLD-MCNC: 10.2 G/DL — LOW (ref 11.5–15.5)
IMM GRANULOCYTES NFR BLD AUTO: 0.7 % — SIGNIFICANT CHANGE UP (ref 0–0.9)
LYMPHOCYTES # BLD AUTO: 0.99 K/UL — LOW (ref 1–3.3)
LYMPHOCYTES # BLD AUTO: 5.3 % — LOW (ref 13–44)
MAGNESIUM SERPL-MCNC: 4.9 MG/DL — HIGH (ref 1.6–2.6)
MAGNESIUM SERPL-MCNC: 5.7 MG/DL — HIGH (ref 1.6–2.6)
MAGNESIUM SERPL-MCNC: 6.3 MG/DL — HIGH (ref 1.6–2.6)
MCHC RBC-ENTMCNC: 30.1 PG — SIGNIFICANT CHANGE UP (ref 27–34)
MCHC RBC-ENTMCNC: 33.4 G/DL — SIGNIFICANT CHANGE UP (ref 32–36)
MCV RBC AUTO: 90 FL — SIGNIFICANT CHANGE UP (ref 80–100)
MONOCYTES # BLD AUTO: 1.26 K/UL — HIGH (ref 0–0.9)
MONOCYTES NFR BLD AUTO: 6.7 % — SIGNIFICANT CHANGE UP (ref 2–14)
NEUTROPHILS # BLD AUTO: 16.27 K/UL — HIGH (ref 1.8–7.4)
NEUTROPHILS NFR BLD AUTO: 87.1 % — HIGH (ref 43–77)
PLATELET # BLD AUTO: 157 K/UL — SIGNIFICANT CHANGE UP (ref 150–400)
POTASSIUM SERPL-MCNC: 3.9 MMOL/L — SIGNIFICANT CHANGE UP (ref 3.5–5.3)
POTASSIUM SERPL-SCNC: 3.9 MMOL/L — SIGNIFICANT CHANGE UP (ref 3.5–5.3)
PROT SERPL-MCNC: 5.1 GM/DL — LOW (ref 6–8.3)
RBC # BLD: 3.39 M/UL — LOW (ref 3.8–5.2)
RBC # FLD: 13.4 % — SIGNIFICANT CHANGE UP (ref 10.3–14.5)
SODIUM SERPL-SCNC: 139 MMOL/L — SIGNIFICANT CHANGE UP (ref 135–145)
T PALLIDUM AB TITR SER: NEGATIVE — SIGNIFICANT CHANGE UP
WBC # BLD: 18.69 K/UL — HIGH (ref 3.8–10.5)
WBC # FLD AUTO: 18.69 K/UL — HIGH (ref 3.8–10.5)

## 2024-11-11 RX ORDER — CALCIUM CARBONATE 215(500)MG
1 TABLET,CHEWABLE ORAL
Refills: 0 | Status: DISCONTINUED | OUTPATIENT
Start: 2024-11-11 | End: 2024-11-14

## 2024-11-11 RX ORDER — IBUPROFEN 200 MG
1 TABLET ORAL
Qty: 0 | Refills: 0 | DISCHARGE
Start: 2024-11-11

## 2024-11-11 RX ORDER — NIFEDIPINE 90 MG
30 TABLET, EXTENDED RELEASE 24 HR ORAL DAILY
Refills: 0 | Status: DISCONTINUED | OUTPATIENT
Start: 2024-11-11 | End: 2024-11-14

## 2024-11-11 RX ORDER — NIFEDIPINE 90 MG
1 TABLET, EXTENDED RELEASE 24 HR ORAL
Qty: 0 | Refills: 0 | DISCHARGE
Start: 2024-11-11

## 2024-11-11 RX ORDER — MAGNESIUM SULFATE IN 0.9% NACL 2 G/50 ML
2 INTRAVENOUS SOLUTION, PIGGYBACK (ML) INTRAVENOUS
Qty: 40 | Refills: 0 | Status: DISCONTINUED | OUTPATIENT
Start: 2024-11-11 | End: 2024-11-14

## 2024-11-11 RX ORDER — ACETAMINOPHEN 500 MG
3 TABLET ORAL
Qty: 0 | Refills: 0 | DISCHARGE
Start: 2024-11-11

## 2024-11-11 RX ORDER — LEVOTHYROXINE SODIUM 88 MCG
25 TABLET ORAL DAILY
Refills: 0 | Status: DISCONTINUED | OUTPATIENT
Start: 2024-11-11 | End: 2024-11-14

## 2024-11-11 RX ORDER — MODIFIED LANOLIN
1 OINTMENT (GRAM) TOPICAL
Qty: 0 | Refills: 0 | DISCHARGE
Start: 2024-11-11

## 2024-11-11 RX ADMIN — Medication 50 GM/HR: at 07:18

## 2024-11-11 RX ADMIN — KETOROLAC TROMETHAMINE 30 MILLIGRAM(S): 30 INJECTION INTRAMUSCULAR; INTRAVENOUS at 06:10

## 2024-11-11 RX ADMIN — KETOROLAC TROMETHAMINE 30 MILLIGRAM(S): 30 INJECTION INTRAMUSCULAR; INTRAVENOUS at 00:25

## 2024-11-11 RX ADMIN — Medication 975 MILLIGRAM(S): at 04:00

## 2024-11-11 RX ADMIN — Medication 50 GM/HR: at 13:24

## 2024-11-11 RX ADMIN — Medication 975 MILLIGRAM(S): at 03:06

## 2024-11-11 RX ADMIN — Medication 25 MICROGRAM(S): at 06:10

## 2024-11-11 RX ADMIN — KETOROLAC TROMETHAMINE 30 MILLIGRAM(S): 30 INJECTION INTRAMUSCULAR; INTRAVENOUS at 06:30

## 2024-11-11 RX ADMIN — Medication 50 GM/HR: at 00:30

## 2024-11-11 RX ADMIN — Medication 1 SPRAY(S): at 11:50

## 2024-11-11 RX ADMIN — Medication 975 MILLIGRAM(S): at 21:17

## 2024-11-11 RX ADMIN — Medication 600 MILLIGRAM(S): at 17:21

## 2024-11-11 RX ADMIN — Medication 40 MILLIGRAM(S): at 06:10

## 2024-11-11 RX ADMIN — Medication 75 MILLILITER(S): at 12:04

## 2024-11-11 RX ADMIN — KETOROLAC TROMETHAMINE 30 MILLIGRAM(S): 30 INJECTION INTRAMUSCULAR; INTRAVENOUS at 11:50

## 2024-11-11 RX ADMIN — Medication 975 MILLIGRAM(S): at 09:22

## 2024-11-11 RX ADMIN — Medication 975 MILLIGRAM(S): at 21:47

## 2024-11-11 RX ADMIN — Medication 30 MILLIGRAM(S): at 11:03

## 2024-11-11 RX ADMIN — Medication 975 MILLIGRAM(S): at 15:06

## 2024-11-11 NOTE — DISCHARGE NOTE OB - CARE PROVIDER_API CALL
Mohan Phoenix  Obstetrics and Gynecology  [address] [place] [address]  Phone: [phone]  Fax: [phone]  Follow Up Time: 2 weeks

## 2024-11-11 NOTE — DISCHARGE NOTE OB - HOSPITAL COURSE
29 year old  female s/p uncomplicated pCS on 11/10/2024. Patient transferred to post partum unit w/ PECwSF oMG based on severe range BP's. Started on Procardia daily.  At the time of discharge patient was tolerating regular diet PO, ambulating, voiding, and demonstrating bowel function. Pain well controlled with pain medications PRN.

## 2024-11-11 NOTE — PROGRESS NOTE ADULT - ASSESSMENT
30yo  on POD#1 status post PLTCS. Patient currently on MgSO4 to complete 24hrs postpartum due to preeclampsia with severe features. AST mildly elevated, s creat and rest of PIH labs WNL. WBC 18K, afebrile, will monitor.     Plan  - routine pp/postop care  - PEC w severe features-- complete MgSO4 for 24hrs pp, Tx Procardia XL, CBC/CMP in am  - regular diet  - DVT prophylaxis-- OOB, lovenox SC, venodynes while in bed  - baby boy-- stable, in NICU due to prematurity (34WGA)

## 2024-11-11 NOTE — DISCHARGE NOTE OB - FINANCIAL ASSISTANCE
[facility] provides services at a reduced cost to those who are determined to be eligible through [facility]’s financial assistance program. Information regarding [facility]’s financial assistance program can be found by going to [email or web address] or by calling [phone].

## 2024-11-11 NOTE — DISCHARGE NOTE OB - SWOLLEN AREA ON THE LEG THAT IS PAINFUL, RED OR HOT
MARCIAL MCCORMICK  [id number]  51y Female POD s/p RCEA upgraded to SICU for close HD monitoring.    She has a pertinent phx of CAD s/p PCE w/stents x4, Afib on Xarelto,         51y female pmhx of CAD s/p PCI w/ stents x4 (plavix last dose ), Afib (Xarelto last dose ), DM and HTN who presented to the hospital  with c/o left eye vision loss. Associated symptoms included nausea while ambulating and blurriness after 24hrs, floaters. Patient initially saw outpatient opthalmologist and who noted optic neuritis of the left eye. Patient was referred to ED for an MRI and neurological evaluation.  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 Werner Sneed in 2-3 weeks. Patient underwent CTA Head and Neck with findings of b/l stenosis of ICA. MRI unable to be completed 2/ anxiety. Neurology recs MRI if significant carotid stenosis and determination if silent unilateral cerebral ischemic events are necessary to qualify for surgery.  Vascular surgery consult rec left CEA. Cardiology optimization completed  (CHADsVASC 5), patient deemed low for risk. Patient underwent the procedure today without intraop complications. Patient easily extubated and brought to PACU. SICU consulted for q1 neurochecks.      Indication for ICU admission:  Admit Date:  ICU Date:  OR Date:    No Known Allergies    PAST MEDICAL & SURGICAL HISTORY:  H/O blurred vision  left --TIA    Depression  denies suicidal ideas    JOCELYNE on CPAP    Obesity    CAD (coronary artery disease)  MI-2016    Carotid stenosis, bilateral    TIA (transient ischemic attack)  2- 2020 and 2020    Hypertension    DM (diabetes mellitus)    Afib    MI (myocardial infarction)  s/p 4 stents (most recent )    History of temporal artery biopsy  2020    H/O hernia repair    H/O endarterectomy  LT-2020    History of cholecystectomy    H/O  section      Home Medications:  aspirin 81 mg oral tablet:  (2020 08:09)  atorvastatin 80 mg oral tablet: 1 tab(s) orally once a day (2020 08:09)  HumaLOG KwikPen 100 units/mL injectable solution: 18 unit(s) injectable 3 times a day (2020 08:09)  metFORMIN 1000 mg oral tablet: 1 tab(s) orally 2 times a day (2020 08:09)  PARoxetine 30 mg oral tablet: 1 tab(s) orally once a day (2020 08:09)  pioglitazone 30 mg oral tablet: 1 tab(s) orally once a day (2020 08:09)        24HRS EVENT:              DVT PTX:     GI PTX:    ***Tubes/Lines/Drains  ***  Peripheral IV  Central Venous Line     	Date   Arterial Line		                Date   [] PICC:         	[] Midline		[] Mediport             Urinary Catheter		Indication: Strict I&O    Date Placed:       REVIEW OF SYSTEMS    [ ] A ten-point review of systems was otherwise negative except as noted.  [ ] Due to altered mental status/intubation, subjective information were not able to be obtained from the patient. History was obtained, to the extent possible, from review of the chart and collateral sources of information.                   MARCIAL MCCORMICK  [id number]  51y Female POD s/p RCEA upgraded to SICU for close HD monitoring.    She has a pertinent phx of CAD s/p PCE w/stents x4, Afib on Xarelto, DM, HTN, JOCELYNE on CPAP, GCA s/p left temporal artery excision..    She presented to [facility] on  c/o left eye vision loss associated w/nausea which resolved shortly after. Upon workup, she was found to have B/L carotid artery stenosis and under went a left CEA on  and underwent a R CEA today.    Case was uncomplicated, patient extubated and transferred to PACU in stable but guarded condition     OR time: 2 hours  EBL:50cc  IVF:2L  UO: N/A    Indication for ICU admission: S/P RCEA upgraded for close HD monitoring  Admit Date:   ICU Date:    OR Date:      No Known Allergies    PAST MEDICAL & SURGICAL HISTORY:  H/O blurred vision  left --TIA  Depression  denies suicidal ideas  JOCELYNE on CPAP  Obesity  CAD (coronary artery disease)  MI-2016  Carotid stenosis, bilateral  TIA (transient ischemic attack)  2- 2020 and 2020  Hypertension  DM (diabetes mellitus)  Afib  MI (myocardial infarction)  s/p 4 stents (most recent )  History of temporal artery biopsy  2020  H/O hernia repair  H/O endarterectomy  LT-2020  History of cholecystectomy  H/O  section    Home Medications:  aspirin 81 mg oral tablet:  (2020 08:09)  atorvastatin 80 mg oral tablet: 1 tab(s) orally once a day (2020 08:09)  HumaLOG KwikPen 100 units/mL injectable solution: 18 unit(s) injectable 3 times a day (2020 08:09)  metFORMIN 1000 mg oral tablet: 1 tab(s) orally 2 times a day (2020 08:09)  PARoxetine 30 mg oral tablet: 1 tab(s) orally once a day (2020 08:09)  pioglitazone 30 mg oral tablet: 1 tab(s) orally once a day (2020 08:09)      24HRS EVENT:    Daily Height in cm: 167.64 (2020 11:24)    Daily     Diet, Regular (20 @ 15:47)      CURRENT MEDS:  Neurologic Medications  acetaminophen   Tablet .. 650 milliGRAM(s) Oral every 6 hours PRN Moderate Pain (4 - 6)  HYDROmorphone  Injectable 1 milliGRAM(s) IV Push every 4 hours PRN Moderate Pain (4 - 6)  HYDROmorphone  Injectable 0.5 milliGRAM(s) IV Push every 10 minutes PRN Moderate Pain (4 - 6)  ondansetron Injectable 4 milliGRAM(s) IV Push once PRN Nausea and/or Vomiting  PARoxetine 30 milliGRAM(s) Oral daily    Respiratory Medications    Cardiovascular Medications    Gastrointestinal Medications  lactated ringers. 1000 milliLiter(s) IV Continuous <Continuous>  senna 2 Tablet(s) Oral at bedtime PRN Constipation    Genitourinary Medications    Hematologic/Oncologic Medications    Antimicrobial/Immunologic Medications  ceFAZolin   IVPB 2000 milliGRAM(s) IV Intermittent every 8 hours    Endocrine/Metabolic Medications  atorvastatin 80 milliGRAM(s) Oral at bedtime    Topical/Other Medications      ICU Vital Signs Last 24 Hrs  T(C): 36.9 (2020 15:30), Max: 36.9 (2020 15:30)  T(F): 98.5 (2020 15:30), Max: 98.5 (2020 15:30)  HR: 84 (2020 16:30) (73 - 89)  BP: 107/51 (2020 16:30) (102/51 - 134/63)  BP(mean): --  ABP: 125/59 (2020 16:30) (101/58 - 125/59)  ABP(mean): 78 (2020 16:30) (71 - 82)  RR: 17 (2020 16:30) (15 - 20)  SpO2: 96% (2020 16:30) (94% - 97%)    I&O's Summary    I&O's Detail      PHYSICAL EXAM:    General/Neuro  aa0x3, slight right deviation, otherwise neurologically intact     Lungs:      clear to auscultation, Normal expansion/effort.     Cardiovascular : S1, S2.  Regular rate and rhythm.     GI: Abdomen soft, Non-tender, Non-distended.      Extremities: Extremities warm, pink, well-perfused. Pulses: DP/PT palpable B/L     Derm: Good skin turgor, no skin breakdown.      CXR:     LABS:  CAPILLARY BLOOD GLUCOSE      POCT Blood Glucose.: 150 mg/dL (2020 08:51)            137  |  101  |  11  ----------------------------<  180<H>  4.0   |  25  |  0.6<L>    Ca    8.9      2020 15:52  Phos  2.7       Mg     1.4         TPro  6.2  /  Alb  3.8  /  TBili  0.9  /  DBili  x   /  AST  20  /  ALT  27  /  AlkPhos  101  -17      PT/INR - ( 2020 15:52 )   PT: 12.80 sec;   INR: 1.11 ratio         PTT - ( 2020 15:52 )  PTT:56.7 sec  CARDIAC MARKERS ( 2020 15:52 )  x     / <0.01 ng/mL / x     / x     / x        DVT PTX:     GI PTX: PPI    ***Tubes/Lines/Drains  ***  Peripheral IV  right radial mustapha      REVIEW OF SYSTEMS    [x ] A ten-point review of systems was otherwise negative except as noted.  [ ] Due to altered mental status/intubation, subjective information were not able to be obtained from the patient. History was obtained, to the extent possible, from review of the chart and collateral sources of information.                   MARCIAL MCCORMICK  [id number]  51y Female POD s/p RCEA upgraded to SICU for close HD monitoring.    She has a pertinent phx of CAD s/p PCI w/stents x4, Afib on Xarelto, DM, HTN, JOCELYNE on CPAP, GCA s/p left temporal artery excision..    She presented to [facility] on  c/o left eye vision loss associated w/nausea which resolved shortly after. Upon workup, she was found to have B/L carotid artery stenosis and under went a left CEA on  and underwent a R CEA today.    Case was uncomplicated, patient extubated and transferred to PACU in stable but guarded condition     OR time: 2 hours  EBL:50cc  IVF:2L  UO: N/A    Indication for ICU admission: S/P RCEA upgraded for close HD monitoring  Admit Date:   ICU Date:    OR Date:      No Known Allergies    PAST MEDICAL & SURGICAL HISTORY:  H/O blurred vision  left --TIA  Depression  denies suicidal ideas  JOCELYNE on CPAP  Obesity  CAD (coronary artery disease)  MI-2016  Carotid stenosis, bilateral  TIA (transient ischemic attack)  2- 2020 and 2020  Hypertension  DM (diabetes mellitus)  Afib  MI (myocardial infarction)  s/p 4 stents (most recent )  History of temporal artery biopsy  2020  H/O hernia repair  H/O endarterectomy  LT-2020  History of cholecystectomy  H/O  section    Home Medications:  aspirin 81 mg oral tablet:  (2020 08:09)  atorvastatin 80 mg oral tablet: 1 tab(s) orally once a day (2020 08:09)  HumaLOG KwikPen 100 units/mL injectable solution: 18 unit(s) injectable 3 times a day (2020 08:09)  metFORMIN 1000 mg oral tablet: 1 tab(s) orally 2 times a day (2020 08:09)  PARoxetine 30 mg oral tablet: 1 tab(s) orally once a day (2020 08:09)  pioglitazone 30 mg oral tablet: 1 tab(s) orally once a day (2020 08:09)      24HRS EVENT:    Daily Height in cm: 167.64 (2020 11:24)    Daily     Diet, Regular (20 @ 15:47)      CURRENT MEDS:  Neurologic Medications  acetaminophen   Tablet .. 650 milliGRAM(s) Oral every 6 hours PRN Moderate Pain (4 - 6)  HYDROmorphone  Injectable 1 milliGRAM(s) IV Push every 4 hours PRN Moderate Pain (4 - 6)  HYDROmorphone  Injectable 0.5 milliGRAM(s) IV Push every 10 minutes PRN Moderate Pain (4 - 6)  ondansetron Injectable 4 milliGRAM(s) IV Push once PRN Nausea and/or Vomiting  PARoxetine 30 milliGRAM(s) Oral daily    Respiratory Medications  Cardiovascular Medications  Gastrointestinal Medications  lactated ringers. 1000 milliLiter(s) IV Continuous <Continuous>  senna 2 Tablet(s) Oral at bedtime PRN Constipation  Genitourinary Medications  Antimicrobial/Immunologic Medications  ceFAZolin   IVPB 2000 milliGRAM(s) IV Intermittent every 8 hours  Endocrine/Metabolic Medications  atorvastatin 80 milliGRAM(s) Oral at bedtime    Topical/Other Medications    ICU Vital Signs Last 24 Hrs  T(C): 36.9 (2020 15:30), Max: 36.9 (2020 15:30)  T(F): 98.5 (2020 15:30), Max: 98.5 (2020 15:30)  HR: 84 (2020 16:30) (73 - 89)  BP: 107/51 (2020 16:30) (102/51 - 134/63)  ABP: 125/59 (2020 16:30) (101/58 - 125/59)  ABP(mean): 78 (2020 16:30) (71 - 82)  RR: 17 (2020 16:30) (15 - 20)  SpO2: 96% (2020 16:30) (94% - 97%)    I&O's Summary    I&O's Detail      PHYSICAL EXAM:    General/Neuro  aa0x3, slight right deviation, otherwise neurologically intact     Lungs:    clear to auscultation, Normal expansion/effort.     Cardiovascular : S1, S2.  Regular rate and rhythm.     GI: Abdomen soft, Non-tender, Non-distended.      Extremities: Extremities warm, pink, well-perfused. Pulses: DP/PT palpable B/L     Derm: Good skin turgor, no skin breakdown.      CXR:     LABS:  CAPILLARY BLOOD GLUCOSE      POCT Blood Glucose.: 150 mg/dL (2020 08:51)        11-17    137  |  101  |  11  ----------------------------<  180<H>  4.0   |  25  |  0.6<L>    Ca    8.9      2020 15:52  Phos  2.7       Mg     1.4         TPro  6.2  /  Alb  3.8  /  TBili  0.9  /  DBili  x   /  AST  20  /  ALT  27  /  AlkPhos  101        PT/INR - ( 2020 15:52 )   PT: 12.80 sec;   INR: 1.11 ratio         PTT - ( 2020 15:52 )  PTT:56.7 sec  CARDIAC MARKERS ( 2020 15:52 )  x     / <0.01 ng/mL / x     / x     / x        DVT PTX:     GI PTX: PPI    ***Tubes/Lines/Drains  ***  Peripheral IV  right radial mustapha      REVIEW OF SYSTEMS    [x ] A ten-point review of systems was otherwise negative except as noted.  [ ] Due to altered mental status/intubation, subjective information were not able to be obtained from the patient. History was obtained, to the extent possible, from review of the chart and collateral sources of information.                   MARCIAL MCCORMICK  [id number]  51y Female POD s/p RCEA upgraded to SICU for close HD monitoring.    She has a pertinent phx of CAD s/p PCI w/stents x4, Afib on Xarelto, DM, HTN, JOCELYNE on CPAP, GCA s/p left temporal artery excision..    She presented to [facility] on  c/o left eye vision loss associated w/nausea which resolved shortly after. Upon workup, she was found to have B/L carotid artery stenosis and under went a left CEA on  and underwent a R CEA today.    Case was uncomplicated, patient extubated and transferred to PACU in stable but guarded condition     OR time: 2 hours  EBL:50cc  IVF:2L  UO: N/A    Indication for ICU admission: S/P RCEA upgraded for close HD monitoring  Admit Date:   ICU Date:    OR Date:      No Known Allergies    PAST MEDICAL & SURGICAL HISTORY:  H/O blurred vision  left --TIA  Depression  denies suicidal ideas  JOCELYNE on CPAP  Obesity  CAD (coronary artery disease)  MI-2016  Carotid stenosis, bilateral  TIA (transient ischemic attack)  2- 2020 and 2020  Hypertension  DM (diabetes mellitus)  Afib  MI (myocardial infarction)  s/p 4 stents (most recent )  History of temporal artery biopsy  2020  H/O hernia repair  H/O endarterectomy  LT-2020  History of cholecystectomy  H/O  section    Home Medications:  aspirin 81 mg oral tablet:  (2020 08:09)  atorvastatin 80 mg oral tablet: 1 tab(s) orally once a day (2020 08:09)  HumaLOG KwikPen 100 units/mL injectable solution: 18 unit(s) injectable 3 times a day (2020 08:09)  metFORMIN 1000 mg oral tablet: 1 tab(s) orally 2 times a day (2020 08:09)  PARoxetine 30 mg oral tablet: 1 tab(s) orally once a day (2020 08:09)  pioglitazone 30 mg oral tablet: 1 tab(s) orally once a day (2020 08:09)      24HRS EVENT:    Daily Height in cm: 167.64 (2020 11:24)    Daily     Diet, Regular (20 @ 15:47)      CURRENT MEDS:  Neurologic Medications  acetaminophen   Tablet .. 650 milliGRAM(s) Oral every 6 hours PRN Moderate Pain (4 - 6)  HYDROmorphone  Injectable 1 milliGRAM(s) IV Push every 4 hours PRN Moderate Pain (4 - 6)  HYDROmorphone  Injectable 0.5 milliGRAM(s) IV Push every 10 minutes PRN Moderate Pain (4 - 6)  ondansetron Injectable 4 milliGRAM(s) IV Push once PRN Nausea and/or Vomiting  PARoxetine 30 milliGRAM(s) Oral daily    Respiratory Medications  Cardiovascular Medications  Gastrointestinal Medications  lactated ringers. 1000 milliLiter(s) IV Continuous <Continuous>  senna 2 Tablet(s) Oral at bedtime PRN Constipation  Genitourinary Medications  Antimicrobial/Immunologic Medications  ceFAZolin   IVPB 2000 milliGRAM(s) IV Intermittent every 8 hours  Endocrine/Metabolic Medications  atorvastatin 80 milliGRAM(s) Oral at bedtime    Topical/Other Medications    ICU Vital Signs Last 24 Hrs  T(C): 36.9 (2020 15:30), Max: 36.9 (2020 15:30)  T(F): 98.5 (2020 15:30), Max: 98.5 (2020 15:30)  HR: 84 (2020 16:30) (73 - 89)  BP: 107/51 (2020 16:30) (102/51 - 134/63)  ABP: 125/59 (2020 16:30) (101/58 - 125/59)  ABP(mean): 78 (2020 16:30) (71 - 82)  RR: 17 (2020 16:30) (15 - 20)  SpO2: 96% (2020 16:30) (94% - 97%)    I&O's Summary    I&O's Detail      PHYSICAL EXAM:    General/Neuro  Aa0x3, slight right deviation, otherwise neurologically intact     Lungs:    clear to auscultation, Normal expansion/effort.     Cardiovascular : S1, S2.  Regular rate and rhythm.     GI: Abdomen soft, Non-tender, Non-distended.      Extremities: Extremities warm, pink, well-perfused. Pulses: DP/PT palpable B/L     Derm: Good skin turgor, no skin breakdown.      CXR:     LABS:  CAPILLARY BLOOD GLUCOSE      POCT Blood Glucose.: 150 mg/dL (2020 08:51)        11-17    137  |  101  |  11  ----------------------------<  180<H>  4.0   |  25  |  0.6<L>    Ca    8.9      2020 15:52  Phos  2.7       Mg     1.4         TPro  6.2  /  Alb  3.8  /  TBili  0.9  /  DBili  x   /  AST  20  /  ALT  27  /  AlkPhos  101        PT/INR - ( 2020 15:52 )   PT: 12.80 sec;   INR: 1.11 ratio         PTT - ( 2020 15:52 )  PTT:56.7 sec  CARDIAC MARKERS ( 2020 15:52 )  x     / <0.01 ng/mL / x     / x     / x        DVT PTX:     GI PTX: PPI    ***Tubes/Lines/Drains  ***  Peripheral IV  right radial mustapha      REVIEW OF SYSTEMS    [x ] A ten-point review of systems was otherwise negative except as noted.  [ ] Due to altered mental status/intubation, subjective information were not able to be obtained from the patient. History was obtained, to the extent possible, from review of the chart and collateral sources of information.                   Statement Selected

## 2024-11-11 NOTE — DISCHARGE NOTE OB - PATIENT PORTAL LINK FT
You can access the FollowMyHealth Patient Portal offered by [facility] by registering at the following website: [email or web address]. By joining [facility] FollowMyHealth portal, you will also be able to view your health information using other applications (apps) compatible with our system.

## 2024-11-11 NOTE — DISCHARGE NOTE OB - MEDICATION SUMMARY - MEDICATIONS TO STOP TAKING
I will STOP taking the medications listed below when I get home from the hospital:    cyclobenzaprine 5 mg oral tablet  -- 1 tab(s) by mouth 2 times a day

## 2024-11-11 NOTE — PROGRESS NOTE ADULT - SUBJECTIVE AND OBJECTIVE BOX
30yo  on POD#1 status post PLTCS due to NRFHT complicated by preeclampsia with severe features. Doing well. Tolerating diet and ambulation without difficulties. Normal lochia. Denies s/s PEC. Postop pain adequately controlled with po meds.     Vital Signs Last 24 Hrs  T(C): 36.5 (11 Nov 2024 06:30), Max: 37 (10 Nov 2024 23:00)  T(F): 97.7 (11 Nov 2024 06:30), Max: 98.6 (10 Nov 2024 23:00)  HR: 100 (11 Nov 2024 06:30) (88 - 113)  BP: 129/72 (11 Nov 2024 06:30) (97/60 - 171/99)  BP(mean): 84 (10 Nov 2024 19:00) (69 - 123)  RR: 18 (11 Nov 2024 06:30) (15 - 34)  SpO2: 98% (11 Nov 2024 06:30) (93% - 99%)    Parameters below as of 11 Nov 2024 06:30  Patient On (Oxygen Delivery Method): room air      Labs                        10.2   18.69 )-----------( 157      ( 11 Nov 2024 05:38 )             30.5     11-11    139  |  109[H]  |  5[L]  ----------------------------<  134[H]  3.9   |  24  |  0.73    Ca    7.6[L]      11 Nov 2024 05:38  Mg     5.7     11-11    TPro  5.1[L]  /  Alb  2.0[L]  /  TBili  0.3  /  DBili  x   /  AST  49[H]  /  ALT  59  /  AlkPhos  164[H]  11-11    Physical exam  Gen AAO x 3  Abd well-contracted uterus, S&D, c/d/i incision  Pelvic normal lochia  Ext no calf tenderness 28yo  on POD#1 status post PLTCS due to NRFHT complicated by preeclampsia with severe features. Doing well. Tolerating diet without difficulties. Pendng to ambulate. +flatus. Normal lochia. Denies s/s PEC. Postop pain adequately controlled with po meds.     Vital Signs Last 24 Hrs  T(C): 36.5 (11 Nov 2024 06:30), Max: 37 (10 Nov 2024 23:00)  T(F): 97.7 (11 Nov 2024 06:30), Max: 98.6 (10 Nov 2024 23:00)  HR: 100 (11 Nov 2024 06:30) (88 - 113)  BP: 129/72 (11 Nov 2024 06:30) (97/60 - 171/99)  BP(mean): 84 (10 Nov 2024 19:00) (69 - 123)  RR: 18 (11 Nov 2024 06:30) (15 - 34)  SpO2: 98% (11 Nov 2024 06:30) (93% - 99%)    Parameters below as of 11 Nov 2024 06:30  Patient On (Oxygen Delivery Method): room air      Labs                        10.2   18.69 )-----------( 157      ( 11 Nov 2024 05:38 )             30.5     11-11    139  |  109[H]  |  5[L]  ----------------------------<  134[H]  3.9   |  24  |  0.73    Ca    7.6[L]      11 Nov 2024 05:38  Mg     5.7     11-11    TPro  5.1[L]  /  Alb  2.0[L]  /  TBili  0.3  /  DBili  x   /  AST  49[H]  /  ALT  59  /  AlkPhos  164[H]  11-11    Physical exam  Gen AAO x 3  Abd well-contracted uterus, S&D, c/d/i incision  Pelvic normal lochia  Ext no calf tenderness

## 2024-11-11 NOTE — DISCHARGE NOTE OB - PLAN OF CARE
1) Continue Procardia (Nifedipine) daily as prescribed.  2) Please follow up with Obstetric Cardiology at your earliest convenience. 1) Please take ibuprofen and tylenol as needed for pain.  2) Nothing in the vagina for 6 weeks (including no sex, no tampons, and no douching).  3) No tub baths or pools for 2 weeks. Showers are okay.  4) Please call your doctor for a follow up appointment  5) Please call the office sooner if you have heavy vaginal bleeding, severe abdominal pain, or fever over 100.4F. MD Mckenna addendum: pt with chorioamninitis, was treated in labor with antibiotics. Had an unscheduled  section due to fetal indication, received prophylactic antibiotics for surgery, Pathology of placenta confirmed chorioanionitis.  Followed post op with CBC, had elevated WBC.

## 2024-11-11 NOTE — DISCHARGE NOTE OB - CARE PLAN
1 Principal Discharge DX:	S/P   Assessment and plan of treatment:	1) Please take ibuprofen and tylenol as needed for pain.  2) Nothing in the vagina for 6 weeks (including no sex, no tampons, and no douching).  3) No tub baths or pools for 2 weeks. Showers are okay.  4) Please call your doctor for a follow up appointment  5) Please call the office sooner if you have heavy vaginal bleeding, severe abdominal pain, or fever over 100.4F.  Secondary Diagnosis:	Hypertension in pregnancy, preeclampsia  Assessment and plan of treatment:	1) Continue Procardia (Nifedipine) daily as prescribed.  2) Please follow up with Obstetric Cardiology at your earliest convenience.   Principal Discharge DX:	S/P   Assessment and plan of treatment:	1) Please take ibuprofen and tylenol as needed for pain.  2) Nothing in the vagina for 6 weeks (including no sex, no tampons, and no douching).  3) No tub baths or pools for 2 weeks. Showers are okay.  4) Please call your doctor for a follow up appointment  5) Please call the office sooner if you have heavy vaginal bleeding, severe abdominal pain, or fever over 100.4F.  Secondary Diagnosis:	Hypertension in pregnancy, preeclampsia  Assessment and plan of treatment:	1) Continue Procardia (Nifedipine) daily as prescribed.  2) Please follow up with Obstetric Cardiology at your earliest convenience.  Assessment and plan of treatment:	MD Mckenna addendum: pt with chorioamninitis, was treated in labor with antibiotics. Had an unscheduled  section due to fetal indication, received prophylactic antibiotics for surgery, Pathology of placenta confirmed chorioanionitis.  Followed post op with CBC, had elevated WBC.

## 2024-11-11 NOTE — DISCHARGE NOTE OB - FOUL SMELLING VAGINAL DISCHARGE
Message routed to provider to approve refill.
Patient says Gama Mora and she discussed at her appointment about refilling her Atenolol which she is now taking 2 tabs daily and is not changed in her \"Meds\" and refilling the HCTZ prescribed by the [facility] ER doctor. Patient is down to 5 tabs. [facility] [place] [facility].
Statement Selected

## 2024-11-11 NOTE — DISCHARGE NOTE OB - NS MD DC FALL RISK RISK
For information on Fall & Injury Prevention, visit: [email or web address] OR  [email or web address] OR  https://www.cdc.gov/steadi/patient.html

## 2024-11-11 NOTE — DISCHARGE NOTE OB - MEDICATION SUMMARY - MEDICATIONS TO TAKE
I will START or STAY ON the medications listed below when I get home from the hospital:    ibuprofen 600 mg oral tablet  -- 1 tab(s) by mouth every 6 hours  -- Indication: For Pain    acetaminophen 325 mg oral tablet  -- 3 tab(s) by mouth every 6 hours  -- Indication: For Pain    NIFEdipine 30 mg oral tablet, extended release  -- 1 tab(s) by mouth once a day  -- Indication: For Hypertension    lanolin topical ointment  -- 1 Apply on skin to affected area every 6 hours As needed Sore Nipples  -- Indication: For Pain    cyclobenzaprine 5 mg oral tablet  -- 1 tab(s) by mouth 2 times a day  -- Indication: For Muscle Spasm    Synthroid 25 mcg (0.025 mg) oral tablet  -- 1 tab(s) by mouth once a day  -- Indication: For Hypothyroid   I will START or STAY ON the medications listed below when I get home from the hospital:    ibuprofen 600 mg oral tablet  -- 1 tab(s) by mouth every 6 hours  -- Indication: For Pain    acetaminophen 325 mg oral tablet  -- 3 tab(s) by mouth every 6 hours  -- Indication: For Pain    NIFEdipine 30 mg oral tablet, extended release  -- 1 tab(s) by mouth once a day  -- Indication: For blood pressure    lanolin topical ointment  -- 1 Apply on skin to affected area every 6 hours As needed Sore Nipples  -- Indication: For Pain    cyclobenzaprine 5 mg oral tablet  -- 1 tab(s) by mouth 2 times a day  -- Indication: For Muscle Spasm    Synthroid 25 mcg (0.025 mg) oral tablet  -- 1 tab(s) by mouth once a day  -- Indication: For Hypothyroid

## 2024-11-12 LAB
ALBUMIN SERPL ELPH-MCNC: 2.1 G/DL — LOW (ref 3.3–5)
ALP SERPL-CCNC: 150 U/L — HIGH (ref 40–120)
ALT FLD-CCNC: 50 U/L — SIGNIFICANT CHANGE UP (ref 12–78)
ANION GAP SERPL CALC-SCNC: 4 MMOL/L — LOW (ref 5–17)
AST SERPL-CCNC: 37 U/L — SIGNIFICANT CHANGE UP (ref 15–37)
BILIRUB SERPL-MCNC: 0.4 MG/DL — SIGNIFICANT CHANGE UP (ref 0.2–1.2)
BUN SERPL-MCNC: 12 MG/DL — SIGNIFICANT CHANGE UP (ref 7–23)
CALCIUM SERPL-MCNC: 7.6 MG/DL — LOW (ref 8.5–10.1)
CHLORIDE SERPL-SCNC: 109 MMOL/L — HIGH (ref 96–108)
CO2 SERPL-SCNC: 28 MMOL/L — SIGNIFICANT CHANGE UP (ref 22–31)
CREAT SERPL-MCNC: 0.72 MG/DL — SIGNIFICANT CHANGE UP (ref 0.5–1.3)
EGFR: 115 ML/MIN/1.73M2 — SIGNIFICANT CHANGE UP
GLUCOSE SERPL-MCNC: 75 MG/DL — SIGNIFICANT CHANGE UP (ref 70–99)
HCT VFR BLD CALC: 31.6 % — LOW (ref 34.5–45)
HGB BLD-MCNC: 10.3 G/DL — LOW (ref 11.5–15.5)
MCHC RBC-ENTMCNC: 29.9 PG — SIGNIFICANT CHANGE UP (ref 27–34)
MCHC RBC-ENTMCNC: 32.6 G/DL — SIGNIFICANT CHANGE UP (ref 32–36)
MCV RBC AUTO: 91.6 FL — SIGNIFICANT CHANGE UP (ref 80–100)
PLATELET # BLD AUTO: 202 K/UL — SIGNIFICANT CHANGE UP (ref 150–400)
POTASSIUM SERPL-MCNC: 3.7 MMOL/L — SIGNIFICANT CHANGE UP (ref 3.5–5.3)
POTASSIUM SERPL-SCNC: 3.7 MMOL/L — SIGNIFICANT CHANGE UP (ref 3.5–5.3)
PROT SERPL-MCNC: 5.2 GM/DL — LOW (ref 6–8.3)
RBC # BLD: 3.45 M/UL — LOW (ref 3.8–5.2)
RBC # FLD: 14 % — SIGNIFICANT CHANGE UP (ref 10.3–14.5)
SODIUM SERPL-SCNC: 141 MMOL/L — SIGNIFICANT CHANGE UP (ref 135–145)
WBC # BLD: 13.99 K/UL — HIGH (ref 3.8–10.5)
WBC # FLD AUTO: 13.99 K/UL — HIGH (ref 3.8–10.5)

## 2024-11-12 RX ADMIN — Medication 975 MILLIGRAM(S): at 22:00

## 2024-11-12 RX ADMIN — Medication 600 MILLIGRAM(S): at 12:24

## 2024-11-12 RX ADMIN — Medication 975 MILLIGRAM(S): at 04:06

## 2024-11-12 RX ADMIN — Medication 975 MILLIGRAM(S): at 08:45

## 2024-11-12 RX ADMIN — Medication 600 MILLIGRAM(S): at 18:25

## 2024-11-12 RX ADMIN — Medication 40 MILLIGRAM(S): at 06:09

## 2024-11-12 RX ADMIN — Medication 600 MILLIGRAM(S): at 00:16

## 2024-11-12 RX ADMIN — Medication 975 MILLIGRAM(S): at 21:30

## 2024-11-12 RX ADMIN — Medication 600 MILLIGRAM(S): at 06:09

## 2024-11-12 RX ADMIN — Medication 975 MILLIGRAM(S): at 03:36

## 2024-11-12 RX ADMIN — Medication 975 MILLIGRAM(S): at 15:05

## 2024-11-12 RX ADMIN — Medication 600 MILLIGRAM(S): at 00:46

## 2024-11-12 RX ADMIN — Medication 25 MICROGRAM(S): at 06:09

## 2024-11-12 RX ADMIN — Medication 30 MILLIGRAM(S): at 10:27

## 2024-11-12 NOTE — PROGRESS NOTE ADULT - SUBJECTIVE AND OBJECTIVE BOX
Postpartum Note,  Section  She is a  29y woman who is now post-operative day: 2  Starting to move back and forth to nursery. Milk just starting to come in.    Subjective:  The patient feels well.  She is ambulating.   She is tolerating regular diet.  She denies nausea and vomiting.  She is voiding.  Her pain is controlled.  She reports normal postpartum bleeding.  She is breastfeeding.  She is formula feeding.    Allergies    No Known Allergies    Intolerances        Physical exam:    Vital Signs Last 24 Hrs  T(C): 36.8 (2024 04:00), Max: 37 (2024 00:19)  T(F): 98.3 (2024 04:00), Max: 98.6 (2024 00:19)  HR: 103 (:00) (91 - 117)  BP: 126/70 (2024 04:00) (111/55 - 135/73)  BP(mean): 82 (:) (82 - 85)  RR: 17 (2024 04:00) (16 - 18)  SpO2: 99% (:00) (98% - 100%)    Parameters below as of 2024 04:00  Patient On (Oxygen Delivery Method): room air        Gen: NAD  Breast: Soft, nontender, not engorged.  Abdomen: Soft, nontender, solftly distended , firm uterine fundus at umbilicus.  Incision: Clean, dry, and intact with steri strips  Pelvic: Normal lochia noted  Ext: No calf tenderness, mild edema    LABS:                        10.2   18.69 )-----------( 157      ( 2024 05:38 )             30.5       Rubella status: IMMUNE      MEDICATIONS  (STANDING):  acetaminophen     Tablet .. 975 milliGRAM(s) Oral <User Schedule>  diphtheria/tetanus/pertussis (acellular) Vaccine (Adacel) 0.5 milliLiter(s) IntraMuscular once  enoxaparin Injectable 40 milliGRAM(s) SubCutaneous every 24 hours  ibuprofen  Tablet. 600 milliGRAM(s) Oral every 6 hours  lactated ringers. 1000 milliLiter(s) (75 mL/Hr) IV Continuous <Continuous>  lactated ringers. 1000 milliLiter(s) (125 mL/Hr) IV Continuous <Continuous>  levothyroxine 25 MICROGram(s) Oral daily  magnesium sulfate Infusion 2 Gm/Hr (50 mL/Hr) IV Continuous <Continuous>  NIFEdipine XL 30 milliGRAM(s) Oral daily  oxytocin Infusion 167 milliUNIT(s)/Min (167 mL/Hr) IV Continuous <Continuous>  oxytocin Infusion 42 milliUNIT(s)/Min (42 mL/Hr) IV Continuous <Continuous>    MEDICATIONS  (PRN):  diphenhydrAMINE 25 milliGRAM(s) Oral every 6 hours PRN Pruritus  lanolin Ointment 1 Application(s) Topical every 6 hours PRN Sore Nipples  magnesium hydroxide Suspension 30 milliLiter(s) Oral two times a day PRN Constipation  oxyCODONE    IR 5 milliGRAM(s) Oral every 3 hours PRN Moderate to Severe Pain (4-10)  oxyCODONE    IR 5 milliGRAM(s) Oral once PRN Moderate to Severe Pain (4-10)  simethicone 80 milliGRAM(s) Chew every 4 hours PRN Gas  sodium chloride 0.65% Nasal 1 Spray(s) Both Nostrils four times a day PRN Nasal Congestion        Assessment and Plan  POD #2 s/p primary c/s for Pre-eclampsia  Procardia 30 mg Qd  anticipate diuresis  Doing well.  Help to establish breastfeeding.  check CBC  Encourage ambulation.   Circumcision desired  Plan for maternal discharge in am.

## 2024-11-13 ENCOUNTER — APPOINTMENT (OUTPATIENT)
Dept: OBGYN | Facility: CLINIC | Age: 29
End: 2024-11-13

## 2024-11-13 RX ADMIN — Medication 975 MILLIGRAM(S): at 09:49

## 2024-11-13 RX ADMIN — Medication 975 MILLIGRAM(S): at 03:52

## 2024-11-13 RX ADMIN — Medication 975 MILLIGRAM(S): at 21:36

## 2024-11-13 RX ADMIN — Medication 600 MILLIGRAM(S): at 00:55

## 2024-11-13 RX ADMIN — Medication 25 MICROGRAM(S): at 06:43

## 2024-11-13 RX ADMIN — Medication 600 MILLIGRAM(S): at 06:44

## 2024-11-13 RX ADMIN — Medication 600 MILLIGRAM(S): at 23:59

## 2024-11-13 RX ADMIN — Medication 600 MILLIGRAM(S): at 18:30

## 2024-11-13 RX ADMIN — Medication 600 MILLIGRAM(S): at 00:25

## 2024-11-13 RX ADMIN — Medication 30 MILLIGRAM(S): at 09:48

## 2024-11-13 RX ADMIN — Medication 975 MILLIGRAM(S): at 22:00

## 2024-11-13 RX ADMIN — Medication 600 MILLIGRAM(S): at 12:33

## 2024-11-13 RX ADMIN — Medication 975 MILLIGRAM(S): at 03:22

## 2024-11-13 RX ADMIN — Medication 40 MILLIGRAM(S): at 06:43

## 2024-11-13 NOTE — PROGRESS NOTE ADULT - SUBJECTIVE AND OBJECTIVE BOX
Section, POD#3    Pt is now POD#3 due to fetal intolerance to labor    She is ambulating without difficulty, tolerating a reg diet, and denies N/V.  No C/O dizziness, no MEHDI.  +Voiding without difficulty.      On exam, pt appears well.  VSS, afebrile.    Vital Signs Last 24 Hrs  T(C): 36.3 (2024 08:00), Max: 36.9 (2024 04:00)  T(F): 97.4 (2024 08:00), Max: 98.5 (2024 04:00)  HR: 89 (2024 08:00) (89 - 99)  BP: 130/81 (2024 08:00) (114/71 - 131/86)  BP(mean): 81 (2024 04:00) (81 - 88)  RR: 17 (2024 08:00) (16 - 18)  SpO2: 99% (2024 08:00) (98% - 100%)    Parameters below as of 2024 08:00  Patient On (Oxygen Delivery Method): room air        Breasts soft, nontender, nonengorged.  Abdomen: Soft, nontender, nondistended , firm uterine fundus.  Incision clean, dry, intact with staples.  Pelvic: Normal lochia noted  Ext: No DVT tenderness, normal pulses, edema WNL for C/S POD#1.    LABS:                        10.3   13.99 )-----------( 202      ( 2024 08:54 )             31.6     11-12    141  |  109[H]  |  12  ----------------------------<  75  3.7   |  28  |  0.72    Ca    7.6[L]      2024 08:54  Mg     6.3     -11    TPro  5.2[L]  /  Alb  2.1[L]  /  TBili  0.4  /  DBili  x   /  AST  37  /  ALT  50  /  AlkPhos  150[H]  11-12      Urinalysis Basic - ( 2024 08:54 )    Color: x / Appearance: x / SG: x / pH: x  Gluc: 75 mg/dL / Ketone: x  / Bili: x / Urobili: x   Blood: x / Protein: x / Nitrite: x   Leuk Esterase: x / RBC: x / WBC x   Sq Epi: x / Non Sq Epi: x / Bacteria: x        Allergies    No Known Allergies    Intolerances      MEDICATIONS  (STANDING):  acetaminophen     Tablet .. 975 milliGRAM(s) Oral <User Schedule>  diphtheria/tetanus/pertussis (acellular) Vaccine (Adacel) 0.5 milliLiter(s) IntraMuscular once  enoxaparin Injectable 40 milliGRAM(s) SubCutaneous every 24 hours  ibuprofen  Tablet. 600 milliGRAM(s) Oral every 6 hours  lactated ringers. 1000 milliLiter(s) (75 mL/Hr) IV Continuous <Continuous>  lactated ringers. 1000 milliLiter(s) (125 mL/Hr) IV Continuous <Continuous>  levothyroxine 25 MICROGram(s) Oral daily  magnesium sulfate Infusion 2 Gm/Hr (50 mL/Hr) IV Continuous <Continuous>  NIFEdipine XL 30 milliGRAM(s) Oral daily  oxytocin Infusion 42 milliUNIT(s)/Min (42 mL/Hr) IV Continuous <Continuous>  oxytocin Infusion 167 milliUNIT(s)/Min (167 mL/Hr) IV Continuous <Continuous>    MEDICATIONS  (PRN):  diphenhydrAMINE 25 milliGRAM(s) Oral every 6 hours PRN Pruritus  lanolin Ointment 1 Application(s) Topical every 6 hours PRN Sore Nipples  magnesium hydroxide Suspension 30 milliLiter(s) Oral two times a day PRN Constipation  oxyCODONE    IR 5 milliGRAM(s) Oral once PRN Moderate to Severe Pain (4-10)  oxyCODONE    IR 5 milliGRAM(s) Oral every 3 hours PRN Moderate to Severe Pain (4-10)  simethicone 80 milliGRAM(s) Chew every 4 hours PRN Gas  sodium chloride 0.65% Nasal 1 Spray(s) Both Nostrils four times a day PRN Nasal Congestion      POD#3 S/P       stable.  Pt recovering well.  Signs and symptoms normal /  abnormal post op courses reviewed. Discharge planning in the am

## 2024-11-14 VITALS
SYSTOLIC BLOOD PRESSURE: 119 MMHG | OXYGEN SATURATION: 100 % | DIASTOLIC BLOOD PRESSURE: 79 MMHG | RESPIRATION RATE: 16 BRPM | HEART RATE: 98 BPM | TEMPERATURE: 98 F

## 2024-11-14 RX ORDER — NIFEDIPINE 90 MG
1 TABLET, EXTENDED RELEASE 24 HR ORAL
Qty: 30 | Refills: 0
Start: 2024-11-14 | End: 2024-12-13

## 2024-11-14 RX ADMIN — Medication 40 MILLIGRAM(S): at 06:22

## 2024-11-14 RX ADMIN — Medication 25 MICROGRAM(S): at 06:22

## 2024-11-14 RX ADMIN — Medication 600 MILLIGRAM(S): at 06:22

## 2024-11-14 RX ADMIN — Medication 975 MILLIGRAM(S): at 08:48

## 2024-11-14 RX ADMIN — Medication 30 MILLIGRAM(S): at 08:48

## 2024-11-14 RX ADMIN — Medication 975 MILLIGRAM(S): at 03:14

## 2024-11-14 RX ADMIN — Medication 600 MILLIGRAM(S): at 00:15

## 2024-11-14 RX ADMIN — Medication 975 MILLIGRAM(S): at 04:00

## 2024-11-14 NOTE — PROGRESS NOTE ADULT - SUBJECTIVE AND OBJECTIVE BOX
Postpartum Note,  Section  She is a  29y woman who is now post-operative day: #3    Subjective:  The patient feels well.  She is ambulating without difficulty.  She is tolerating PO.  She is voiding.  She denies nausea and vomiting.  Her pain is controlled.  She reports normal postpartum bleeding  She is pumping/supplementing (baby in NICU)  BM x3  Bps well controlled on procardia  Physical exam:    Vital Signs Last 24 Hrs  T(C): 36.7 (2024 08:23), Max: 36.8 (2024 04:15)  T(F): 98 (2024 08:23), Max: 98.3 (2024 04:15)  HR: 98 (:23) (88 - 98)  BP: 119/79 (:23) (119/79 - 135/82)  BP(mean): --  RR: 16 (:23) (16 - 17)  SpO2: 100% (:23) (99% - 100%)    Parameters below as of 2024 08:23  Patient On (Oxygen Delivery Method): room air        Gen: NAD  Breast: Soft, nontender, non engorged  Abdomen: Soft, nontender, no distension , firm uterine fundus at umbilicus.  Incision: Clean, dry, and intact with steri strips  Pelvic: Normal lochia noted  Ext: No calf tenderness    LABS:    blood type  Rubella status:     Allergies    No Known Allergies    Intolerances      MEDICATIONS  (STANDING):  acetaminophen     Tablet .. 975 milliGRAM(s) Oral <User Schedule>  diphtheria/tetanus/pertussis (acellular) Vaccine (Adacel) 0.5 milliLiter(s) IntraMuscular once  enoxaparin Injectable 40 milliGRAM(s) SubCutaneous every 24 hours  ibuprofen  Tablet. 600 milliGRAM(s) Oral every 6 hours  lactated ringers. 1000 milliLiter(s) (75 mL/Hr) IV Continuous <Continuous>  lactated ringers. 1000 milliLiter(s) (125 mL/Hr) IV Continuous <Continuous>  levothyroxine 25 MICROGram(s) Oral daily  magnesium sulfate Infusion 2 Gm/Hr (50 mL/Hr) IV Continuous <Continuous>  NIFEdipine XL 30 milliGRAM(s) Oral daily  oxytocin Infusion 167 milliUNIT(s)/Min (167 mL/Hr) IV Continuous <Continuous>  oxytocin Infusion 42 milliUNIT(s)/Min (42 mL/Hr) IV Continuous <Continuous>    MEDICATIONS  (PRN):  diphenhydrAMINE 25 milliGRAM(s) Oral every 6 hours PRN Pruritus  lanolin Ointment 1 Application(s) Topical every 6 hours PRN Sore Nipples  magnesium hydroxide Suspension 30 milliLiter(s) Oral two times a day PRN Constipation  oxyCODONE    IR 5 milliGRAM(s) Oral once PRN Moderate to Severe Pain (4-10)  oxyCODONE    IR 5 milliGRAM(s) Oral every 3 hours PRN Moderate to Severe Pain (4-10)  simethicone 80 milliGRAM(s) Chew every 4 hours PRN Gas  sodium chloride 0.65% Nasal 1 Spray(s) Both Nostrils four times a day PRN Nasal Congestion        Assessment and Plan  POD #3  Doing well.  Encourage ambulation.  Discharge home today.  Prescriptions for tyleno, and motrin electronically sent to the pharmacy.  F/U in 2 weeks for incision check.  Call for fevers, chills, nausea, vomiting, heavy vaginal bleeding, vaginal discharge, severe pain, symptoms of depression, problems with incision or any other concerning symptoms.  Nothing in vagina and no heavy lifting x 6 weeks.  No driving x 2 weeks.  continue procardia, has f/u with cardiology, and post op check with me  COnsent obtained and signed and witnessed for  circumcision (when he is cleared)            Postpartum Note,  Section  She is a  29y woman who is now post-operative day: #3    Subjective:  The patient feels well.  She is ambulating without difficulty.  She is tolerating PO.  She is voiding.  She denies nausea and vomiting.  Her pain is controlled.  She reports normal postpartum bleeding  She is pumping/supplementing (baby in NICU)  BM x3  Bps well controlled on procardia  Physical exam:    Vital Signs Last 24 Hrs  T(C): 36.7 (2024 08:23), Max: 36.8 (2024 04:15)  T(F): 98 (2024 08:23), Max: 98.3 (2024 04:15)  HR: 98 (:23) (88 - 98)  BP: 119/79 (2024 08:23) (119/79 - 135/82)  BP(mean): --  RR: 16 (:23) (16 - 17)  SpO2: 100% (:23) (99% - 100%)    Parameters below as of 2024 08:23  Patient On (Oxygen Delivery Method): room air    Bps 120s-130s/80s, this am 119/79    Gen: NAD  Breast: Soft, nontender, non engorged  Abdomen: Soft, nontender, no distension , firm uterine fundus at umbilicus.  Incision: Clean, dry, and intact with steri strips  Pelvic: Normal lochia noted  Ext: No calf tenderness    LABS:    blood type  Rubella status:     Allergies    No Known Allergies    Intolerances      MEDICATIONS  (STANDING):  acetaminophen     Tablet .. 975 milliGRAM(s) Oral <User Schedule>  diphtheria/tetanus/pertussis (acellular) Vaccine (Adacel) 0.5 milliLiter(s) IntraMuscular once  enoxaparin Injectable 40 milliGRAM(s) SubCutaneous every 24 hours  ibuprofen  Tablet. 600 milliGRAM(s) Oral every 6 hours  lactated ringers. 1000 milliLiter(s) (75 mL/Hr) IV Continuous <Continuous>  lactated ringers. 1000 milliLiter(s) (125 mL/Hr) IV Continuous <Continuous>  levothyroxine 25 MICROGram(s) Oral daily  magnesium sulfate Infusion 2 Gm/Hr (50 mL/Hr) IV Continuous <Continuous>  NIFEdipine XL 30 milliGRAM(s) Oral daily  oxytocin Infusion 167 milliUNIT(s)/Min (167 mL/Hr) IV Continuous <Continuous>  oxytocin Infusion 42 milliUNIT(s)/Min (42 mL/Hr) IV Continuous <Continuous>    MEDICATIONS  (PRN):  diphenhydrAMINE 25 milliGRAM(s) Oral every 6 hours PRN Pruritus  lanolin Ointment 1 Application(s) Topical every 6 hours PRN Sore Nipples  magnesium hydroxide Suspension 30 milliLiter(s) Oral two times a day PRN Constipation  oxyCODONE    IR 5 milliGRAM(s) Oral once PRN Moderate to Severe Pain (4-10)  oxyCODONE    IR 5 milliGRAM(s) Oral every 3 hours PRN Moderate to Severe Pain (4-10)  simethicone 80 milliGRAM(s) Chew every 4 hours PRN Gas  sodium chloride 0.65% Nasal 1 Spray(s) Both Nostrils four times a day PRN Nasal Congestion        Assessment and Plan  POD #3 s/p primary csection for cat 2, h/o PPROM, complicated by PEC w SF s.p magnesium sulfate, on procardia, bps well controlled  Doing well.  Encourage ambulation.  Discharge home today.  Prescriptions for tyleno, and motrin electronically sent to the pharmacy.  F/U in 2 weeks for incision check.  Call for fevers, chills, nausea, vomiting, heavy vaginal bleeding, vaginal discharge, severe pain, symptoms of depression, problems with incision or any other concerning symptoms.  Nothing in vagina and no heavy lifting x 6 weeks.  No driving x 2 weeks.  continue procardia, has f/u with cardiology, and post op check with me  COnsent obtained and signed and witnessed for  circumcision (when he is cleared)

## 2024-11-15 ENCOUNTER — NON-APPOINTMENT (OUTPATIENT)
Age: 29
End: 2024-11-15

## 2024-11-18 ENCOUNTER — NON-APPOINTMENT (OUTPATIENT)
Age: 29
End: 2024-11-18

## 2024-11-19 ENCOUNTER — NON-APPOINTMENT (OUTPATIENT)
Age: 29
End: 2024-11-19

## 2024-11-19 LAB — SURGICAL PATHOLOGY STUDY: SIGNIFICANT CHANGE UP

## 2024-11-20 ENCOUNTER — APPOINTMENT (OUTPATIENT)
Age: 29
End: 2024-11-20
Payer: COMMERCIAL

## 2024-11-20 PROCEDURE — S9443: CPT | Mod: 95

## 2024-11-22 NOTE — CDI QUERY NOTE - NSCDIOTHERTXTBX_GEN_ALL_CORE_HH
Clinical documentation and/or evidence in the medical record indicates that this patient has a pathology report finding without an associated diagnosis. In order to ensure accurate coding and accuracy of the clinical record, the documentation in this patient’s medical record requires additional clarification.    Please clarify after further study, evaluation, review of pathology findings and treatment if Chorioamnionitis was:    -Chorioamnionitis Ruled in  -Chorioamnionitis Ruled out  -Other Please Specify  -Not Clinically Significant    Supporting documentation and/or clinical evidence:      Pathology Report Impression: Final Diagnosis Placenta, excision: -Acute chorioamnionitis -  Umbilical cord with 3 vessels   ; prenatal rupture of membranes. Gross Description Received: Without fixative labeled "placenta" Extraplacental membranes: Completeness: Complete Color: Pink-tan Appearance: Translucent Insertion: Marginal Rupture site: 4.2 cm Umbilical cord: Length: Attached: 5.3 cm Diameter: Attached: 1.2 cm Color: Tan-white Vessels: 3 True Knot: Absent Insertion: Eccentric, 3.2 cm from edge of disc Placental disc: Shape: Ovoid Weight: 254 g (trimmed) Dimensions: 15.5 x 15.2 x 3.0 cm Fetal Surface: Blue-gray, smooth, and glistening Vessels: Diffuse pattern of arborization from cord insertion site Maternal Surface: Complete with intact cotyledons Parenchyma: Dark red and spongy Lesion: No lesions are identified Retroplacental Clot: Present, 4.3 x 1.5 cm linear, central area of disruption with adherent clotted blood Submitted: Representative sections in 2 cassettes: 1A: Membrane roll and umbilical cord 1B: Full-thickness section of parenchyma with retroplacental defect BETI Locke ([facility]) 2024 05:49 PM Disclaimer In addition to other data that may appear on the specimen containers, all labels have been inspected to confirm the presence of the patient's name and date of birth.    RR  25-31 117 162/93 171/99 98.6    WBC Count: 13.99 K/uL (24 @ 08:54)   WBC Count: 18.69 K/uL (.. @ 05:38)   WBC Count: 14.43: Test Repeated K/uL (.10.24 @ 18:09)   WBC Count: 6.93 K/uL (.10.24 @ 03:42)     TX  AMPICILLIN AND Azithromycin       Labor:  PPROM < 14 hours; AF clear with adequate volumes; temperature patterns afebrile, T Max ~ 36.5; chorioamnionitis - none, but had one dose of [id number] Noiv @ 0317 hours, Betamethasone a@ 0315 hours; EOS 0.29 ; Pain control/meds epidural_ Fidencio Padron)  (Signed 10-Nov-2024 16:57)    Pt evaluated for sever range BPS. States that she is in pain s/p c-sectionNo blurry vision, HA. upper abd pain, CP, SOBEdema in legs 2 + pitting  Vital Signs Last 24 HrsT(C): 36.5 (10 Nov 2024 15:40), Max: 36.5 (10 Nov 2024 03:45)T(F): 97.7 (10 Nov 2024 15:40), Max: 97.7 (10 Nov 2024 03:45)HR: 105 (10 Nov 2024 16:40) (77 - 108)BP: 158/87 (10 Nov 2024 16:40) (131/76 - 162/93)BP(mean): 103 (10 Nov 2024 16:40) (69 - 122)RR: 25 (10 Nov 2024 16:40) (17 - 34)SpO2: 96% (10 Nov 2024 16:40) (96% - 99%)Parameters below as of 10 Nov 2024 03:51  Patient On (Oxygen Delivery Method): room air    Push IVP labetalol 20 Start magnesium sulfate for prophylaxisstat [facility] labs    MD1 6:28 PMPt seen and examined. Agree with note above. Had disc with husb and patient. Clinical status and reasons for magnesium disc.  Will monitor BPS, currently in normal range. If cont to increase in mild range will start on procardia. Will check labs.Jud Natarajan (MD)  (Signed 10-Nov-2024 18:30)	Authored: Naomi Kerr)  (Signed 10-Nov-2024 16:5

## 2024-11-22 NOTE — CDI QUERY NOTE - NSCDI_DOCCLARIFY2_GEN_ALL_CORE_FT_PREVIEWDISPLAY
Problem: Patient Care Overview (Adult)  Goal: Plan of Care Review  Outcome: Ongoing (interventions implemented as appropriate)  Goal: Adult Individualization and Mutuality  Outcome: Ongoing (interventions implemented as appropriate)  Goal: Discharge Needs Assessment  Outcome: Ongoing (interventions implemented as appropriate)    Problem: Acute Coronary Syndrome (ACS) (Adult)  Goal: Signs and Symptoms of Listed Potential Problems Will be Absent or Manageable (Acute Coronary Syndrome)  Outcome: Ongoing (interventions implemented as appropriate)       In responding to this request, please exercise your independent professional judgment. The fact that a question is asked does not imply that any particular answer is desired or expected. Documentation clarification is required for compliance.   This form is NOT a part of the permanent Medical Record.

## 2024-11-25 ENCOUNTER — NON-APPOINTMENT (OUTPATIENT)
Age: 29
End: 2024-11-25

## 2024-11-25 ENCOUNTER — APPOINTMENT (OUTPATIENT)
Dept: ANTEPARTUM | Facility: CLINIC | Age: 29
End: 2024-11-25

## 2024-11-25 ENCOUNTER — APPOINTMENT (OUTPATIENT)
Dept: OBGYN | Facility: CLINIC | Age: 29
End: 2024-11-25
Payer: COMMERCIAL

## 2024-11-25 VITALS
HEIGHT: 56 IN | BODY MASS INDEX: 29.47 KG/M2 | WEIGHT: 131 LBS | DIASTOLIC BLOOD PRESSURE: 70 MMHG | SYSTOLIC BLOOD PRESSURE: 110 MMHG

## 2024-11-25 DIAGNOSIS — Z98.890 OTHER SPECIFIED POSTPROCEDURAL STATES: ICD-10-CM

## 2024-11-25 PROCEDURE — 0503F POSTPARTUM CARE VISIT: CPT

## 2024-11-26 DIAGNOSIS — E03.9 HYPOTHYROIDISM, UNSPECIFIED: ICD-10-CM

## 2024-11-26 DIAGNOSIS — O41.1230 CHORIOAMNIONITIS, THIRD TRIMESTER, NOT APPLICABLE OR UNSPECIFIED: ICD-10-CM

## 2024-11-26 DIAGNOSIS — Z3A.34 34 WEEKS GESTATION OF PREGNANCY: ICD-10-CM

## 2024-12-02 ENCOUNTER — APPOINTMENT (OUTPATIENT)
Dept: CARDIOLOGY | Facility: CLINIC | Age: 29
End: 2024-12-02
Payer: COMMERCIAL

## 2024-12-02 ENCOUNTER — APPOINTMENT (OUTPATIENT)
Dept: OBGYN | Facility: CLINIC | Age: 29
End: 2024-12-02

## 2024-12-02 VITALS
OXYGEN SATURATION: 98 % | BODY MASS INDEX: 29.25 KG/M2 | DIASTOLIC BLOOD PRESSURE: 68 MMHG | SYSTOLIC BLOOD PRESSURE: 116 MMHG | HEIGHT: 56 IN | HEART RATE: 92 BPM | WEIGHT: 130 LBS

## 2024-12-02 DIAGNOSIS — R94.31 ABNORMAL ELECTROCARDIOGRAM [ECG] [EKG]: ICD-10-CM

## 2024-12-02 DIAGNOSIS — O14.90 UNSPECIFIED PRE-ECLAMPSIA, UNSPECIFIED TRIMESTER: ICD-10-CM

## 2024-12-02 PROCEDURE — 99204 OFFICE O/P NEW MOD 45 MIN: CPT

## 2024-12-02 PROCEDURE — 93000 ELECTROCARDIOGRAM COMPLETE: CPT

## 2024-12-02 RX ORDER — NIFEDIPINE 30 MG/1
30 TABLET, FILM COATED, EXTENDED RELEASE ORAL DAILY
Refills: 0 | Status: ACTIVE | COMMUNITY

## 2024-12-11 ENCOUNTER — NON-APPOINTMENT (OUTPATIENT)
Age: 29
End: 2024-12-11

## 2024-12-13 ENCOUNTER — TRANSCRIPTION ENCOUNTER (OUTPATIENT)
Age: 29
End: 2024-12-13

## 2024-12-17 ENCOUNTER — TRANSCRIPTION ENCOUNTER (OUTPATIENT)
Age: 29
End: 2024-12-17

## 2024-12-20 ENCOUNTER — NON-APPOINTMENT (OUTPATIENT)
Age: 29
End: 2024-12-20

## 2024-12-23 ENCOUNTER — APPOINTMENT (OUTPATIENT)
Dept: CARDIOLOGY | Facility: CLINIC | Age: 29
End: 2024-12-23
Payer: COMMERCIAL

## 2024-12-23 ENCOUNTER — APPOINTMENT (OUTPATIENT)
Age: 29
End: 2024-12-23
Payer: COMMERCIAL

## 2024-12-23 ENCOUNTER — APPOINTMENT (OUTPATIENT)
Dept: OBGYN | Facility: CLINIC | Age: 29
End: 2024-12-23
Payer: COMMERCIAL

## 2024-12-23 VITALS
HEIGHT: 56 IN | BODY MASS INDEX: 29.25 KG/M2 | SYSTOLIC BLOOD PRESSURE: 108 MMHG | DIASTOLIC BLOOD PRESSURE: 62 MMHG | WEIGHT: 130 LBS

## 2024-12-23 DIAGNOSIS — O14.90 UNSPECIFIED PRE-ECLAMPSIA, UNSPECIFIED TRIMESTER: ICD-10-CM

## 2024-12-23 PROCEDURE — 99441: CPT

## 2024-12-23 PROCEDURE — 0503F POSTPARTUM CARE VISIT: CPT

## 2024-12-23 PROCEDURE — S9443: CPT

## 2024-12-23 RX ORDER — NORETHINDRONE 0.35 MG/1
0.35 TABLET ORAL DAILY
Qty: 3 | Refills: 1 | Status: ACTIVE | COMMUNITY
Start: 2024-12-23 | End: 1900-01-01

## 2024-12-31 ENCOUNTER — TRANSCRIPTION ENCOUNTER (OUTPATIENT)
Age: 29
End: 2024-12-31

## 2025-01-29 ENCOUNTER — APPOINTMENT (OUTPATIENT)
Dept: OBGYN | Facility: CLINIC | Age: 30
End: 2025-01-29
Payer: COMMERCIAL

## 2025-01-29 VITALS
HEART RATE: 91 BPM | OXYGEN SATURATION: 97 % | WEIGHT: 130 LBS | BODY MASS INDEX: 29.25 KG/M2 | HEIGHT: 56 IN | DIASTOLIC BLOOD PRESSURE: 67 MMHG | SYSTOLIC BLOOD PRESSURE: 120 MMHG

## 2025-01-29 DIAGNOSIS — Z87.59 PERSONAL HISTORY OF OTHER COMPLICATIONS OF PREGNANCY, CHILDBIRTH AND THE PUERPERIUM: ICD-10-CM

## 2025-01-29 DIAGNOSIS — Z01.419 ENCOUNTER FOR GYNECOLOGICAL EXAMINATION (GENERAL) (ROUTINE) W/OUT ABNORMAL FINDINGS: ICD-10-CM

## 2025-01-29 PROCEDURE — 99395 PREV VISIT EST AGE 18-39: CPT

## 2025-02-01 LAB
CYTOLOGY CVX/VAG DOC THIN PREP: NORMAL
HPV HIGH+LOW RISK DNA PNL CVX: NOT DETECTED

## 2025-02-14 ENCOUNTER — TRANSCRIPTION ENCOUNTER (OUTPATIENT)
Age: 30
End: 2025-02-14

## 2025-02-14 RX ORDER — NORETHINDRONE ACETATE AND ETHINYL ESTRADIOL AND FERROUS FUMARATE 1MG-20(21)
1-20 KIT ORAL DAILY
Qty: 3 | Refills: 1 | Status: ACTIVE | COMMUNITY
Start: 2025-02-14 | End: 1900-01-01

## 2025-02-17 ENCOUNTER — TRANSCRIPTION ENCOUNTER (OUTPATIENT)
Age: 30
End: 2025-02-17

## 2025-06-16 NOTE — OB RN INTRAOPERATIVE NOTE - NS_ELECTROPADLOC_OBGYN_ALL_OB
Right thigh Patient presents to the ED with abdominal cramping beginning this morning, patient is 23 weeks pregnant with normal follow up outside of high BMI. Patient states she's had three days of nausea and one episode of vomiting. LMP 01/19/25.Patient denies bleeding, fevers, trauma. Alert, oriented, HRR, respirations even and unlabored. No lower extremity swelling.

## 2025-08-05 ENCOUNTER — NON-APPOINTMENT (OUTPATIENT)
Age: 30
End: 2025-08-05

## 2025-08-07 ENCOUNTER — APPOINTMENT (OUTPATIENT)
Dept: FAMILY MEDICINE | Facility: CLINIC | Age: 30
End: 2025-08-07

## 2025-08-07 ENCOUNTER — APPOINTMENT (OUTPATIENT)
Dept: INTERNAL MEDICINE | Facility: CLINIC | Age: 30
End: 2025-08-07

## 2025-08-07 VITALS
RESPIRATION RATE: 14 BRPM | SYSTOLIC BLOOD PRESSURE: 128 MMHG | BODY MASS INDEX: 28.34 KG/M2 | HEIGHT: 56 IN | WEIGHT: 126 LBS | OXYGEN SATURATION: 98 % | TEMPERATURE: 98 F | DIASTOLIC BLOOD PRESSURE: 73 MMHG | HEART RATE: 74 BPM

## 2025-08-07 DIAGNOSIS — E78.5 HYPERLIPIDEMIA, UNSPECIFIED: ICD-10-CM

## 2025-08-07 DIAGNOSIS — Z00.00 ENCOUNTER FOR GENERAL ADULT MEDICAL EXAMINATION W/OUT ABNORMAL FINDINGS: ICD-10-CM

## 2025-08-07 DIAGNOSIS — E03.9 HYPOTHYROIDISM, UNSPECIFIED: ICD-10-CM

## 2025-08-07 DIAGNOSIS — Z13.6 ENCOUNTER FOR SCREENING FOR CARDIOVASCULAR DISORDERS: ICD-10-CM

## 2025-08-07 DIAGNOSIS — Z02.9 ENCOUNTER FOR ADMINISTRATIVE EXAMINATIONS, UNSPECIFIED: ICD-10-CM

## 2025-08-07 PROCEDURE — 99395 PREV VISIT EST AGE 18-39: CPT

## 2025-08-07 PROCEDURE — 99214 OFFICE O/P EST MOD 30 MIN: CPT | Mod: 25

## 2025-08-07 PROCEDURE — 93000 ELECTROCARDIOGRAM COMPLETE: CPT

## 2025-08-08 LAB
ALBUMIN SERPL ELPH-MCNC: 4.5 G/DL
ALP BLD-CCNC: 84 U/L
ALT SERPL-CCNC: 12 U/L
ANION GAP SERPL CALC-SCNC: 13 MMOL/L
APPEARANCE: CLEAR
AST SERPL-CCNC: 15 U/L
BASOPHILS # BLD AUTO: 0.03 K/UL
BASOPHILS NFR BLD AUTO: 0.5 %
BILIRUB SERPL-MCNC: 0.4 MG/DL
BILIRUBIN URINE: NEGATIVE
BLOOD URINE: NEGATIVE
BUN SERPL-MCNC: 13 MG/DL
CALCIUM SERPL-MCNC: 10.5 MG/DL
CHLORIDE SERPL-SCNC: 105 MMOL/L
CHOLEST SERPL-MCNC: 233 MG/DL
CO2 SERPL-SCNC: 25 MMOL/L
COLOR: YELLOW
CREAT SERPL-MCNC: 0.72 MG/DL
EGFRCR SERPLBLD CKD-EPI 2021: 115 ML/MIN/1.73M2
EOSINOPHIL # BLD AUTO: 0.14 K/UL
EOSINOPHIL NFR BLD AUTO: 2.4 %
ESTIMATED AVERAGE GLUCOSE: 114 MG/DL
GLUCOSE QUALITATIVE U: NEGATIVE MG/DL
GLUCOSE SERPL-MCNC: 93 MG/DL
HBA1C MFR BLD HPLC: 5.6 %
HBV SURFACE AB SER QL: REACTIVE
HCT VFR BLD CALC: 42.3 %
HDLC SERPL-MCNC: 59 MG/DL
HGB BLD-MCNC: 13.2 G/DL
IMM GRANULOCYTES NFR BLD AUTO: 0.2 %
KETONES URINE: NEGATIVE MG/DL
LDLC SERPL-MCNC: 150 MG/DL
LEUKOCYTE ESTERASE URINE: NEGATIVE
LYMPHOCYTES # BLD AUTO: 2.14 K/UL
LYMPHOCYTES NFR BLD AUTO: 36.6 %
MAN DIFF?: NORMAL
MCHC RBC-ENTMCNC: 29.1 PG
MCHC RBC-ENTMCNC: 31.2 G/DL
MCV RBC AUTO: 93.2 FL
MEV IGG FLD QL IA: 14.5 AU/ML
MEV IGG+IGM SER-IMP: NORMAL
MONOCYTES # BLD AUTO: 0.46 K/UL
MONOCYTES NFR BLD AUTO: 7.9 %
MUV AB SER-ACNC: POSITIVE
MUV IGG SER QL IA: 96.2 AU/ML
NEUTROPHILS # BLD AUTO: 3.07 K/UL
NEUTROPHILS NFR BLD AUTO: 52.4 %
NITRITE URINE: NEGATIVE
NONHDLC SERPL-MCNC: 174 MG/DL
PH URINE: 6.5
PLATELET # BLD AUTO: 421 K/UL
POTASSIUM SERPL-SCNC: 4.3 MMOL/L
PROT SERPL-MCNC: 7.3 G/DL
PROTEIN URINE: NEGATIVE MG/DL
RBC # BLD: 4.54 M/UL
RBC # FLD: 13.2 %
RUBV IGG FLD-ACNC: 3.11 INDEX
RUBV IGG SER-IMP: POSITIVE
SODIUM SERPL-SCNC: 144 MMOL/L
SPECIFIC GRAVITY URINE: 1.02
T3 SERPL-MCNC: 123 NG/DL
T4 FREE SERPL-MCNC: 1.2 NG/DL
T4 SERPL-MCNC: 7.3 UG/DL
TRIGL SERPL-MCNC: 135 MG/DL
TSH SERPL-ACNC: 0.26 UIU/ML
UROBILINOGEN URINE: 0.2 MG/DL
VZV AB TITR SER: POSITIVE
VZV IGG SER IF-ACNC: 1.19 S/CO
WBC # FLD AUTO: 5.85 K/UL

## 2025-08-10 LAB
M TB IFN-G BLD-IMP: NEGATIVE
QUANTIFERON TB PLUS MITOGEN MINUS NIL: >10 IU/ML
QUANTIFERON TB PLUS NIL: 0.02 IU/ML
QUANTIFERON TB PLUS TB1 MINUS NIL: 0 IU/ML
QUANTIFERON TB PLUS TB2 MINUS NIL: 0 IU/ML

## 2025-08-20 ENCOUNTER — TRANSCRIPTION ENCOUNTER (OUTPATIENT)
Age: 30
End: 2025-08-20

## 2025-08-25 ENCOUNTER — APPOINTMENT (OUTPATIENT)
Dept: FAMILY MEDICINE | Facility: CLINIC | Age: 30
End: 2025-08-25
Payer: COMMERCIAL

## 2025-08-25 ENCOUNTER — MED ADMIN CHARGE (OUTPATIENT)
Age: 30
End: 2025-08-25

## 2025-08-25 PROCEDURE — 90471 IMMUNIZATION ADMIN: CPT

## 2025-08-25 PROCEDURE — 90707 MMR VACCINE SC: CPT
